# Patient Record
Sex: FEMALE | Race: WHITE | Employment: FULL TIME | URBAN - METROPOLITAN AREA
[De-identification: names, ages, dates, MRNs, and addresses within clinical notes are randomized per-mention and may not be internally consistent; named-entity substitution may affect disease eponyms.]

---

## 2017-01-20 NOTE — ASSESSMENT & PLAN NOTE
Continue Cymbalta, but start lamotrigine 25 nightly titrating every 2 weeks up to 100. PHQ9 score of 19.   In September we had the M[employer] Godinez navigator try to contact her about options but apparently this made her more upset as they were counting her at wo

## 2017-01-20 NOTE — PROGRESS NOTES
Patient presents with:  Depression: PT states for about 2-3 weeks she wakes up from sleeping and she just wants to start crying and having trouble concentrating. Stress: Pt states she is not handling stress she has just been extra angulo.        HPI:   Melvia Nails September we had the Vee Males navigator try to contact her about options but apparently this made her more upset as they were counting her at work. I discussed possibly doing a short-term psychiatry management option that was recently offered.   She is

## 2017-02-03 ENCOUNTER — OFFICE VISIT (OUTPATIENT)
Dept: FAMILY MEDICINE CLINIC | Facility: CLINIC | Age: 57
End: 2017-02-03

## 2017-02-03 VITALS
RESPIRATION RATE: 16 BRPM | HEART RATE: 84 BPM | SYSTOLIC BLOOD PRESSURE: 130 MMHG | BODY MASS INDEX: 31.65 KG/M2 | HEIGHT: 65 IN | TEMPERATURE: 99 F | DIASTOLIC BLOOD PRESSURE: 82 MMHG | WEIGHT: 190 LBS

## 2017-02-03 DIAGNOSIS — I10 ESSENTIAL HYPERTENSION: Primary | ICD-10-CM

## 2017-02-03 DIAGNOSIS — F33.41 RECURRENT MAJOR DEPRESSIVE DISORDER, IN PARTIAL REMISSION (HCC): ICD-10-CM

## 2017-02-03 PROCEDURE — 99213 OFFICE O/P EST LOW 20 MIN: CPT | Performed by: FAMILY MEDICINE

## 2017-02-03 RX ORDER — LOSARTAN POTASSIUM 50 MG/1
50 TABLET ORAL DAILY
Qty: 90 TABLET | Refills: 3 | Status: SHIPPED | OUTPATIENT
Start: 2017-02-03 | End: 2017-02-26

## 2017-02-03 NOTE — PROGRESS NOTES
Patient presents with:  Depression: 2 week f/u Pt states she has been feeling good on medication. Pt states has had some muscle spasm s in the past week.    Medication Request: Losartan       HPI:   This is a 49year old female coming in for depression, muc Continue present management.     Blood Pressure and Cardiac Medications          Losartan Potassium 50 MG Oral Tab                 Relevant Medications    Losartan Potassium 50 MG Oral Tab       Mental Health    Major depression in partial remission ([facility] [address]

## 2017-02-03 NOTE — ASSESSMENT & PLAN NOTE
Stable, Continue present management.     Blood Pressure and Cardiac Medications          Losartan Potassium 50 MG Oral Tab

## 2017-02-26 DIAGNOSIS — I10 ESSENTIAL HYPERTENSION: ICD-10-CM

## 2017-02-26 DIAGNOSIS — J45.30 MILD PERSISTENT ASTHMA WITHOUT COMPLICATION: Primary | ICD-10-CM

## 2017-02-27 RX ORDER — LOSARTAN POTASSIUM 50 MG/1
TABLET ORAL
Qty: 90 TABLET | Refills: 1 | Status: SHIPPED | OUTPATIENT
Start: 2017-02-27 | End: 2018-03-14

## 2017-02-27 NOTE — TELEPHONE ENCOUNTER
Refill for Losartan and Foradil approved per protocol. LOV 02/03/17 and ACT 01/20/17. Labs 06/03/16.

## 2017-02-27 NOTE — TELEPHONE ENCOUNTER
Refill request for Cymbalta and Lamotrigine requested. LOV 02/03/17 and labs 06/03/16. Will you approve ? Routed to Dr Jay Epstein.

## 2017-02-28 ENCOUNTER — TELEPHONE (OUTPATIENT)
Dept: FAMILY MEDICINE CLINIC | Facility: CLINIC | Age: 57
End: 2017-02-28

## 2017-02-28 NOTE — TELEPHONE ENCOUNTER
[facility] pharmacy calling to inform us that med FORADIL AEROLIZER 12 MCG Inhalation Cap has been discontinued. What else can she have?

## 2017-03-02 DIAGNOSIS — J45.30 MILD PERSISTENT ASTHMA WITHOUT COMPLICATION: Primary | ICD-10-CM

## 2017-03-02 NOTE — PROGRESS NOTES
Switch patient to Flovent HFA from Foradil for asthma prevention, different action. F/u advised in 1 month to review asthma tx plan.

## 2017-03-02 NOTE — TELEPHONE ENCOUNTER
I sent a new script to pharmacy for Flovent HFA, substituting Foradil. Different action medication, as this is a steroid inhaler. Pt should f/u in 1 month for review of asthma treatment plan. Make sure she rinses mouth after use to prevent thrush. Thanks.

## 2017-03-03 NOTE — TELEPHONE ENCOUNTER
Notified pt that Flovent has been prescribed to replace Foradil. Reviewed directives with pt and she will follow up in office in one month.

## 2017-03-31 NOTE — PROGRESS NOTES
Patient presents with:  Depression  Cough: x1 week w/ productive cough and bodyaches. HPI:   This is a 49year old female coming in for derpession and bronchitis, sugars as well are up    Depression better, and Lamictal only one nightly. O    HPI membrane, external ear and ear canal normal.   Left Ear: Hearing, tympanic membrane and external ear normal.   Nose: Mucosal edema present. No rhinorrhea, sinus tenderness or septal deviation. No epistaxis. Right sinus exhibits frontal sinus tenderness.  Ri

## 2017-04-02 ENCOUNTER — HOSPITAL ENCOUNTER (OUTPATIENT)
Age: 57
Discharge: HOME | End: 2017-04-02
Attending: EMERGENCY MEDICINE
Payer: COMMERCIAL

## 2017-04-02 VITALS
RESPIRATION RATE: 18 BRPM | HEART RATE: 98 BPM | SYSTOLIC BLOOD PRESSURE: 121 MMHG | DIASTOLIC BLOOD PRESSURE: 84 MMHG | HEIGHT: 65.5 IN | WEIGHT: 160 LBS | BODY MASS INDEX: 26.34 KG/M2 | TEMPERATURE: 98 F | OXYGEN SATURATION: 99 %

## 2017-04-02 DIAGNOSIS — B34.9 VIRAL ILLNESS: Primary | ICD-10-CM

## 2017-04-02 PROCEDURE — 99203 OFFICE O/P NEW LOW 30 MIN: CPT

## 2017-04-02 PROCEDURE — 99204 OFFICE O/P NEW MOD 45 MIN: CPT

## 2017-04-02 PROCEDURE — 81002 URINALYSIS NONAUTO W/O SCOPE: CPT

## 2017-04-02 PROCEDURE — 82962 GLUCOSE BLOOD TEST: CPT

## 2017-04-02 RX ORDER — BENZONATATE 100 MG/1
100 CAPSULE ORAL 3 TIMES DAILY PRN
Qty: 20 CAPSULE | Refills: 0 | Status: SHIPPED | OUTPATIENT
Start: 2017-04-02 | End: 2017-06-09 | Stop reason: ALTCHOICE

## 2017-04-02 NOTE — ED NOTES
Patient states she also just feels tired. She states she is fighting a URI that she is being treated for and the cough keeps her up.   She denies real numbness at this time in her bilateral thighs but she stated at times she couldn't feel her thigh when sh

## 2017-04-02 NOTE — ED PROVIDER NOTES
Patient Seen in: [address]    History   Patient presents with:  Numbness Weakness (neurologic)    Stated Complaint: numbness in thighs feels sweaty     HPI    Patient presents to immediate care complaining of vague symp deficiency      Dx in 2012   • Borderline diabetes      Dx in 2013           Past Surgical History    TONSILLECTOMY      Comment In childhood    OTHER SURGICAL HISTORY      Comment Dental surgeries       Medications :   benzonatate (TESSALON PERLES) 100 MG Disorder Neg          Smoking Status: Never Smoker                      Smokeless Status: Never Used                        Alcohol Use: Yes           0.0 oz/week       0 Standard drinks or equivalent per week       Comment: 1 drink a month      Review of HENT:   Head: Normocephalic and atraumatic. Right Ear: External ear normal.   Left Ear: External ear normal.   Nose: Nose normal.   Mouth/Throat: Oropharynx is clear and moist. No oropharyngeal exudate.    Eyes: EOM are normal. Pupils are equal, round, capsule Refills: 0

## 2017-04-02 NOTE — ED INITIAL ASSESSMENT (HPI)
Patient is here with complaints of feeling like she is burning up but is without a fever and numbness in both thighs. She states it started two days ago.

## 2017-04-03 ENCOUNTER — TELEPHONE (OUTPATIENT)
Dept: FAMILY MEDICINE CLINIC | Facility: CLINIC | Age: 57
End: 2017-04-03

## 2017-04-03 NOTE — TELEPHONE ENCOUNTER
Patient would like to speak to nurse. Patient is not feeling any better, pt was at the urgent care yesterday. Patient still has an ongoing cough feels like she's burning up but no fever and congested. Please contact patient.

## 2017-04-03 NOTE — TELEPHONE ENCOUNTER
Pt called to report that after her 3/31/17 visit here , Pt ended up going to Urgent Care on yesterday due to concern over productive cough.  Pt has been taking her Z-Pack and Breo as ordered, but Pt says she was feeling warm and coughing up brown/yellow spu

## 2017-05-08 ENCOUNTER — OFFICE VISIT (OUTPATIENT)
Dept: FAMILY MEDICINE CLINIC | Facility: CLINIC | Age: 57
End: 2017-05-08

## 2017-05-08 VITALS
WEIGHT: 192.38 LBS | RESPIRATION RATE: 16 BRPM | DIASTOLIC BLOOD PRESSURE: 70 MMHG | BODY MASS INDEX: 32 KG/M2 | TEMPERATURE: 99 F | HEART RATE: 80 BPM | SYSTOLIC BLOOD PRESSURE: 110 MMHG

## 2017-05-08 DIAGNOSIS — R05.9 COUGH IN ADULT: Primary | ICD-10-CM

## 2017-05-08 DIAGNOSIS — K59.04 CHRONIC IDIOPATHIC CONSTIPATION: ICD-10-CM

## 2017-05-08 DIAGNOSIS — R73.03 PREDIABETES: ICD-10-CM

## 2017-05-08 DIAGNOSIS — K21.9 GASTROESOPHAGEAL REFLUX DISEASE, ESOPHAGITIS PRESENCE NOT SPECIFIED: ICD-10-CM

## 2017-05-08 PROCEDURE — 99214 OFFICE O/P EST MOD 30 MIN: CPT | Performed by: FAMILY MEDICINE

## 2017-05-08 RX ORDER — METFORMIN HYDROCHLORIDE 500 MG/1
500 TABLET, EXTENDED RELEASE ORAL DAILY
Qty: 90 TABLET | Refills: 0 | Status: SHIPPED | OUTPATIENT
Start: 2017-05-08 | End: 2017-06-09 | Stop reason: SINTOL

## 2017-05-08 NOTE — PROGRESS NOTES
Chief Complaint:  Patient presents with:  Cough: this started sbout 3 weeks- was on Abx- still taking TessalonPelrle- still coughing up yellow-foamy like sputum  Medication Follow-Up: the Metformin is causing weight gain and bloating- may not be tolerating diabetes      Dx in 2013     Past Surgical History   Procedure Laterality Date   • Tonsillectomy       In childhood   • Other surgical history       Dental surgeries       Family History   Problem Relation Age of Onset   • Arthritis Mother    • Cancer Moth TABLET BY MOUTH DAILY. Disp: 90 tablet Rfl: 3   Clobetasol Propionate (TEMOVATE) 0.05 % Apply Externally Solution Apply topically to affected area twice a day as needed.  Disp: 50 mL Rfl: 5   ALBUTEROL SULFATE (VENTOLIN HFA) 108 (90 BASE) MCG/ACT Inhalation specified               Advised the following:  Sherman Haywood was seen today for cough, medication follow-up and nasal congestion. Diagnoses and all orders for this visit:    Cough in adult  Unclear etiology.  Feel this is likely still recovery from previous ill

## 2017-05-19 ENCOUNTER — TELEPHONE (OUTPATIENT)
Dept: FAMILY MEDICINE CLINIC | Facility: CLINIC | Age: 57
End: 2017-05-19

## 2017-05-19 NOTE — TELEPHONE ENCOUNTER
Called and talked to patient the metformin was causing her to gain weight when she stopped it the weight started to go down I told her I would discuss this with Dr James Turcios and see what other options she had

## 2017-05-19 NOTE — TELEPHONE ENCOUNTER
OK to stop. A1c 6.1%. May be water weight gain. Let's see next 1 months' changes.  Future Appointments  Date Time Provider Letty Avendaño   5/26/2017 2:30 PM Neli Rehman MD [facility]   6/9/2017 11:30 AM Mario Green MD [facility] [address] [facility] [address]

## 2017-05-19 NOTE — TELEPHONE ENCOUNTER
On Metformin and she is having an issue with bloating, pants won't fit, gained 7-8 lbs, feet and hands swelling.  She stopped taking it and all issues have stopped, does she need to be seen again for a new prescription to be issued or can you prescribe for

## 2017-05-30 RX ORDER — LAMOTRIGINE 25 MG/1
TABLET ORAL
Qty: 90 TABLET | Refills: 3 | Status: SHIPPED | OUTPATIENT
Start: 2017-05-30 | End: 2018-02-20

## 2017-06-08 RX ORDER — LEVOTHYROXINE SODIUM 112 UG/1
TABLET ORAL
Qty: 90 TABLET | Refills: 3 | Status: SHIPPED | OUTPATIENT
Start: 2017-06-08 | End: 2018-08-31

## 2017-06-08 NOTE — TELEPHONE ENCOUNTER
Refill request for:      Pending Prescriptions Disp Refills    LEVOTHYROXINE SODIUM 112 MCG Oral Tab [Pharmacy Med Name: LEVOTHYROXINE 112 MCG TABLET] 90 tablet 3     Sig: TAKE 1 TABLET BY MOUTH DAILY.             LOV 5/8/2017     Future Appointments  Date

## 2017-06-09 ENCOUNTER — OFFICE VISIT (OUTPATIENT)
Dept: FAMILY MEDICINE CLINIC | Facility: CLINIC | Age: 57
End: 2017-06-09

## 2017-06-09 VITALS
RESPIRATION RATE: 16 BRPM | DIASTOLIC BLOOD PRESSURE: 80 MMHG | HEART RATE: 76 BPM | SYSTOLIC BLOOD PRESSURE: 112 MMHG | WEIGHT: 190 LBS | BODY MASS INDEX: 31.65 KG/M2 | TEMPERATURE: 99 F | HEIGHT: 65 IN

## 2017-06-09 DIAGNOSIS — F33.41 RECURRENT MAJOR DEPRESSIVE DISORDER, IN PARTIAL REMISSION (HCC): ICD-10-CM

## 2017-06-09 DIAGNOSIS — M25.551 RIGHT HIP PAIN: ICD-10-CM

## 2017-06-09 DIAGNOSIS — Z12.31 VISIT FOR SCREENING MAMMOGRAM: ICD-10-CM

## 2017-06-09 DIAGNOSIS — E03.9 HYPOTHYROIDISM (ACQUIRED): ICD-10-CM

## 2017-06-09 DIAGNOSIS — I10 ESSENTIAL HYPERTENSION: ICD-10-CM

## 2017-06-09 DIAGNOSIS — R73.03 PREDIABETES: Primary | ICD-10-CM

## 2017-06-09 PROCEDURE — 99214 OFFICE O/P EST MOD 30 MIN: CPT | Performed by: FAMILY MEDICINE

## 2017-06-09 NOTE — PROGRESS NOTES
HPI:   Patient presents with:  Hypertension: 6month f/u       Anny Gamez is a 56year old female who presents for recheck of her hypertension. She has been taking medications as instructed, with no medication side effects.  Her home BP monitoring Particles TAKE ONE CAPSULE BY MOUTH TWICE A DAY   Mometasone Furoate 0.1 % External Cream APPLY TO BACK TWICE A DAY   Clobetasol Propionate (TEMOVATE) 0.05 % Apply Externally Solution Apply topically to affected area twice a day as needed.    ALBUTEROL SULF adenopathy,no bruits  LUNGS: clear to auscultation  CARDIO: RRR without murmur  GI: good BS's,no masses, HSM or tenderness  EXTREMITIES: no cyanosis, clubbing or edema  NEURO: A&O to person place and time.     ASSESSMENT AND PLAN:   Noa Jerry is Diagnoses     Right hip pain         Relevant Orders     XR HIP + PELVIS MIN 4 VIEWS RIGHT (CPT=73503)     Visit for screening mammogram         Relevant Orders     JUAN MANUEL SCREENING BILAT (CPT=77067)            Return in about 6 months (around 12/9/2017).

## 2017-06-09 NOTE — ASSESSMENT & PLAN NOTE
As for her Pre-Diabetes, it is well controlled, no significant medication side effects noted. Recommendations are: continue present meds, lose weight by increased dietary compliance and exercise and will check labs as ordered.     Lab Results  Component

## 2017-06-09 NOTE — ASSESSMENT & PLAN NOTE
Stable, Continue present management.     Thyroid  (most recent labs)     Lab Results  Component Value Date/Time   TSH 2.06 03/25/2017 10:34 AM   T4F 1.2 03/25/2017 10:34 AM         Endocrine Medications          LEVOTHYROXINE SODIUM 112 MCG Oral Tab

## 2017-08-28 PROBLEM — K31.7 GASTRIC POLYPS: Status: ACTIVE | Noted: 2017-08-28

## 2017-08-28 PROBLEM — K44.9 HIATAL HERNIA: Status: ACTIVE | Noted: 2017-08-28

## 2017-08-28 PROBLEM — K29.70 GASTRITIS: Status: ACTIVE | Noted: 2017-08-28

## 2017-08-28 PROBLEM — K57.90 DIVERTICULOSIS: Status: ACTIVE | Noted: 2017-08-28

## 2017-08-28 PROBLEM — K63.5 COLON POLYPS: Status: ACTIVE | Noted: 2017-08-28

## 2017-09-07 ENCOUNTER — TELEPHONE (OUTPATIENT)
Dept: FAMILY MEDICINE CLINIC | Facility: CLINIC | Age: 57
End: 2017-09-07

## 2017-09-07 DIAGNOSIS — Z13.228 SCREENING FOR ENDOCRINE, METABOLIC AND IMMUNITY DISORDER: ICD-10-CM

## 2017-09-07 DIAGNOSIS — Z13.0 SCREENING FOR ENDOCRINE, METABOLIC AND IMMUNITY DISORDER: ICD-10-CM

## 2017-09-07 DIAGNOSIS — Z13.220 SCREENING FOR LIPID DISORDERS: Primary | ICD-10-CM

## 2017-09-07 DIAGNOSIS — Z13.29 SCREENING FOR ENDOCRINE, METABOLIC AND IMMUNITY DISORDER: ICD-10-CM

## 2017-09-07 NOTE — TELEPHONE ENCOUNTER
Please place orders for CMP and lipid panel to her lab. She has existing orders for other labs already.

## 2017-09-07 NOTE — TELEPHONE ENCOUNTER
Patient scheduled physical for 9-11 please place lab orders to [facility] [place], [place].

## 2017-09-09 LAB
ALBUMIN/GLOBULIN RATIO: 1.6 (CALC) (ref 1–2.5)
ALBUMIN: 4.1 G/DL (ref 3.6–5.1)
ALKALINE PHOSPHATASE: 111 U/L (ref 33–130)
ALT: 26 U/L (ref 6–29)
AST: 23 U/L (ref 10–35)
BILIRUBIN, TOTAL: 0.8 MG/DL (ref 0.2–1.2)
BUN: 12 MG/DL (ref 7–25)
CALCIUM: 9.2 MG/DL (ref 8.6–10.4)
CARBON DIOXIDE: 25 MMOL/L (ref 20–31)
CHLORIDE: 105 MMOL/L (ref 98–110)
CHOL/HDLC RATIO: 4.2 (CALC)
CHOLESTEROL, TOTAL: 220 MG/DL
CREATININE: 0.65 MG/DL (ref 0.5–1.05)
EGFR IF AFRICN AM: 116 ML/MIN/1.73M2
EGFR IF NONAFRICN AM: 100 ML/MIN/1.73M2
GLOBULIN: 2.5 G/DL (CALC) (ref 1.9–3.7)
GLUCOSE: 108 MG/DL (ref 65–99)
HDL CHOLESTEROL: 52 MG/DL
LDL-CHOLESTEROL: 141 MG/DL (CALC)
NON-HDL CHOLESTEROL: 168 MG/DL (CALC)
POTASSIUM: 4.1 MMOL/L (ref 3.5–5.3)
PROTEIN, TOTAL: 6.6 G/DL (ref 6.1–8.1)
SODIUM: 140 MMOL/L (ref 135–146)
TRIGLYCERIDES: 143 MG/DL

## 2017-09-11 ENCOUNTER — OFFICE VISIT (OUTPATIENT)
Dept: FAMILY MEDICINE CLINIC | Facility: CLINIC | Age: 57
End: 2017-09-11

## 2017-09-11 ENCOUNTER — TELEPHONE (OUTPATIENT)
Dept: FAMILY MEDICINE CLINIC | Facility: CLINIC | Age: 57
End: 2017-09-11

## 2017-09-11 VITALS
TEMPERATURE: 99 F | DIASTOLIC BLOOD PRESSURE: 80 MMHG | BODY MASS INDEX: 30.52 KG/M2 | SYSTOLIC BLOOD PRESSURE: 130 MMHG | RESPIRATION RATE: 16 BRPM | WEIGHT: 185.38 LBS | HEIGHT: 65.25 IN | HEART RATE: 92 BPM

## 2017-09-11 DIAGNOSIS — I83.90 VARICOSE VEINS OF LOWER EXTREMITY: ICD-10-CM

## 2017-09-11 DIAGNOSIS — Z00.00 ROUTINE GENERAL MEDICAL EXAMINATION AT A HEALTH CARE FACILITY: Primary | ICD-10-CM

## 2017-09-11 DIAGNOSIS — R73.03 PREDIABETES: ICD-10-CM

## 2017-09-11 PROCEDURE — 99396 PREV VISIT EST AGE 40-64: CPT | Performed by: PHYSICIAN ASSISTANT

## 2017-09-11 NOTE — TELEPHONE ENCOUNTER
Patient requesting refill of LEVOTHYROXINE SODIUM 112 MCG Oral Tab, would like sent to [facility]. Patient was seen today and forgot to ask for refill

## 2017-09-11 NOTE — PATIENT INSTRUCTIONS
Prevention Guidelines, Women Ages 48 to 59  Screening tests and vaccines are an important part of managing your health. Health counseling is essential, too. Below are guidelines for these, for women ages 48 to 59.  Talk with your healthcare provider to ma Lung cancer Adults age 54 to [de-identified] who have smoked Yearly screening in smokers with 30 pack-year history of smoking or who quit within 15 years   Obesity All women in this age group At routine exams   Osteoporosis Women who are postmenopausal Ask your healthc PPSV23: 1 to 2 doses through age 59, or 1 dose at 72 or older (protects against 23 types of pneumococcal bacteria)   Tetanus/diphtheria/pertussis (Td/Tdap) booster All women in this age group Td every 10 years, or a one-time dose of Tdap instead of a Td angie Varicose veins may or may not cause symptoms.  If symptoms do occur, they can include:  · Legs that feel tired, achy, heavy, or itchy  · Leg muscle cramps  · Skin changes, such as discoloration, dryness, redness, or rash (in more severe cases, you may also Follow up with your healthcare provider, or as directed. If imaging tests were done, you’ll be told the results and if there are any new findings that affect your care.   When to seek medical advice  Call your healthcare provider right away if any of these

## 2017-09-11 NOTE — PROGRESS NOTES
Sara Corea is a 56year old female who presents for a complete physical exam. She has no complaints other than painless mild varicose veins of the RLE.  HPI:   See GYN for PAP and mammograms.       Wt Readings from Last 6 Encounters:  09/11/17 : 185 lb 6.4 oz  0 DR Particles TAKE ONE CAPSULE BY MOUTH TWICE A DAY Disp: 180 capsule Rfl: 1   Clobetasol Propionate (TEMOVATE) 0.05 % Apply Externally Solution Apply topically to affected area twice a day as needed.  Disp: 50 mL Rfl: 5   ALBUTEROL SULFATE (VENTOLIN HFA) 10 Smokeless tobacco: Never Used                      Alcohol use: Yes           0.0 oz/week     Comment: 1 drink a month        REVIEW OF SYSTEMS:   General: Feels well otherwise  Skin: Denies any unusual skin lesions, rashes, or change in moles  Eyes: D examination at a health care facility  (primary encounter diagnosis)  Prediabetes  Varicose veins of lower extremity  I discussed her labs with Osmin Ernandez. She will continue to exercise and watch carb intake. Forms for her employer filled out.   A referral for

## 2017-09-12 ENCOUNTER — HOSPITAL ENCOUNTER (OUTPATIENT)
Dept: MAMMOGRAPHY | Age: 57
Discharge: HOME | End: 2017-09-12
Attending: FAMILY MEDICINE
Payer: COMMERCIAL

## 2017-09-12 ENCOUNTER — HOSPITAL ENCOUNTER (OUTPATIENT)
Dept: GENERAL RADIOLOGY | Age: 57
Discharge: HOME | End: 2017-09-12
Attending: FAMILY MEDICINE
Payer: COMMERCIAL

## 2017-09-12 DIAGNOSIS — Z12.31 VISIT FOR SCREENING MAMMOGRAM: ICD-10-CM

## 2017-09-12 DIAGNOSIS — M25.551 RIGHT HIP PAIN: ICD-10-CM

## 2017-09-12 PROCEDURE — 77067 SCR MAMMO BI INCL CAD: CPT | Performed by: FAMILY MEDICINE

## 2017-09-12 PROCEDURE — 73502 X-RAY EXAM HIP UNI 2-3 VIEWS: CPT | Performed by: FAMILY MEDICINE

## 2017-09-14 ENCOUNTER — HOSPITAL ENCOUNTER (OUTPATIENT)
Dept: MAMMOGRAPHY | Facility: HOSPITAL | Age: 57
Discharge: HOME | End: 2017-09-14
Attending: FAMILY MEDICINE
Payer: COMMERCIAL

## 2017-09-14 DIAGNOSIS — R92.8 ABNORMAL MAMMOGRAM OF LEFT BREAST: ICD-10-CM

## 2017-09-14 PROCEDURE — 77065 DX MAMMO INCL CAD UNI: CPT | Performed by: FAMILY MEDICINE

## 2017-09-14 PROCEDURE — 76642 ULTRASOUND BREAST LIMITED: CPT | Performed by: FAMILY MEDICINE

## 2017-09-14 PROCEDURE — 77061 BREAST TOMOSYNTHESIS UNI: CPT | Performed by: FAMILY MEDICINE

## 2017-10-16 ENCOUNTER — OFFICE VISIT (OUTPATIENT)
Dept: FAMILY MEDICINE CLINIC | Facility: CLINIC | Age: 57
End: 2017-10-16

## 2017-10-16 VITALS
DIASTOLIC BLOOD PRESSURE: 64 MMHG | TEMPERATURE: 99 F | BODY MASS INDEX: 30.99 KG/M2 | HEIGHT: 65 IN | HEART RATE: 84 BPM | WEIGHT: 186 LBS | SYSTOLIC BLOOD PRESSURE: 124 MMHG

## 2017-10-16 DIAGNOSIS — J02.9 SORE THROAT: Primary | ICD-10-CM

## 2017-10-16 PROCEDURE — 87880 STREP A ASSAY W/OPTIC: CPT | Performed by: PHYSICIAN ASSISTANT

## 2017-10-16 PROCEDURE — 99213 OFFICE O/P EST LOW 20 MIN: CPT | Performed by: PHYSICIAN ASSISTANT

## 2017-10-16 NOTE — PROGRESS NOTES
CC:  Patient presents with:  Sore Throat: x  1 week, glands in neck feel swolled, difficulty swallowing      HPI: Sejal Viera presents with complaints of a ST and chills. Symptoms have been present for 7 days. She has taken nothing OTC today.  No cough or N/V. Furoate-Vilanterol (BREO ELLIPTA) 200-25 MCG/INH Inhalation Aerosol Powder, Breath Activated Inhale 1 puff into the lungs daily.  Disp: 3 each Rfl: 3   LOSARTAN POTASSIUM 50 MG Oral Tab TAKE 1 TABLET EVERY DAY Disp: 90 tablet Rfl: 1   CYMBALTA 60 MG Oral Ca deviation    Throat: No erythema; no oral lesions or exudates; tonsils not enlarged   Eyes: PERRLA; no ulcers, abrasions, or FBs; lids normal; no edema   Neck: Normal ROM; no deformities or tenderness to palpation; no thyromegaly  Lymph:  No cervical/supra

## 2017-12-02 ENCOUNTER — TELEPHONE (OUTPATIENT)
Dept: FAMILY MEDICINE CLINIC | Facility: CLINIC | Age: 57
End: 2017-12-02

## 2017-12-02 NOTE — TELEPHONE ENCOUNTER
Patient is looking for a refill on her medication to be refilled from 2012. She has an appt scheduled for 12/14/17 with Dr. Jacob Corbin and she is seeing a chiropractor for pain and spasms. She can not come in today.

## 2017-12-02 NOTE — TELEPHONE ENCOUNTER
Called and talked to patient she would like a refill of the Flexeril from 2012 because she is having back spasms which she is seeing chiropractor for I will send this to Dr Luis Singh to see if he will refill this

## 2017-12-03 DIAGNOSIS — F33.41 RECURRENT MAJOR DEPRESSIVE DISORDER, IN PARTIAL REMISSION (HCC): ICD-10-CM

## 2017-12-03 RX ORDER — CYCLOBENZAPRINE HCL 10 MG
10 TABLET ORAL 3 TIMES DAILY
Qty: 30 TABLET | Refills: 1 | Status: SHIPPED | OUTPATIENT
Start: 2017-12-03 | End: 2017-12-23

## 2017-12-14 ENCOUNTER — OFFICE VISIT (OUTPATIENT)
Dept: FAMILY MEDICINE CLINIC | Facility: CLINIC | Age: 57
End: 2017-12-14

## 2017-12-14 VITALS
WEIGHT: 188 LBS | RESPIRATION RATE: 14 BRPM | TEMPERATURE: 98 F | BODY MASS INDEX: 31.32 KG/M2 | SYSTOLIC BLOOD PRESSURE: 120 MMHG | HEART RATE: 66 BPM | DIASTOLIC BLOOD PRESSURE: 68 MMHG | HEIGHT: 65 IN

## 2017-12-14 DIAGNOSIS — F33.41 RECURRENT MAJOR DEPRESSIVE DISORDER, IN PARTIAL REMISSION (HCC): ICD-10-CM

## 2017-12-14 DIAGNOSIS — M54.2 NECK PAIN: ICD-10-CM

## 2017-12-14 DIAGNOSIS — E03.9 HYPOTHYROIDISM (ACQUIRED): ICD-10-CM

## 2017-12-14 DIAGNOSIS — I10 ESSENTIAL HYPERTENSION: Primary | ICD-10-CM

## 2017-12-14 DIAGNOSIS — M54.59 MECHANICAL LOW BACK PAIN: ICD-10-CM

## 2017-12-14 DIAGNOSIS — R73.03 PREDIABETES: ICD-10-CM

## 2017-12-14 PROCEDURE — 99214 OFFICE O/P EST MOD 30 MIN: CPT | Performed by: FAMILY MEDICINE

## 2017-12-14 RX ORDER — CYCLOBENZAPRINE HCL 5 MG
TABLET ORAL 3 TIMES DAILY PRN
Qty: 30 TABLET | Refills: 1 | Status: SHIPPED | OUTPATIENT
Start: 2017-12-14 | End: 2018-07-27 | Stop reason: ALTCHOICE

## 2017-12-14 NOTE — PROGRESS NOTES
Patient presents with:  Depression: med check   Spasms: Pt states she would like a muscle relaxer for back       HPI:   This is a 56year old female coming in for follow-up hypertension and back. Back spasms ongoing.   Depression is stable and needs refill Constitutional: She is oriented to person, place, and time. She appears well-developed and well-nourished. No distress. HENT:   Head: Normocephalic. Neck: Normal range of motion. Neck supple.    Cardiovascular: Normal rate, regular rhythm, S1 normal, Partial remission, continue this level, get light therapy. Stable, reassess in 3 months.              Other    Prediabetes    Overview     A1c 6.0, endocrinologist started metformin 500 BID         Current Assessment & Plan     As for her Pre-Diabetes,

## 2018-02-20 ENCOUNTER — OFFICE VISIT (OUTPATIENT)
Dept: FAMILY MEDICINE CLINIC | Facility: CLINIC | Age: 58
End: 2018-02-20

## 2018-02-20 VITALS — BODY MASS INDEX: 30.29 KG/M2 | RESPIRATION RATE: 16 BRPM | WEIGHT: 184 LBS | HEIGHT: 65.5 IN

## 2018-02-20 DIAGNOSIS — L30.9 ECZEMA, UNSPECIFIED TYPE: ICD-10-CM

## 2018-02-20 DIAGNOSIS — J01.40 ACUTE NON-RECURRENT PANSINUSITIS: Primary | ICD-10-CM

## 2018-02-20 DIAGNOSIS — L65.9 HAIR LOSS: ICD-10-CM

## 2018-02-20 PROCEDURE — 99214 OFFICE O/P EST MOD 30 MIN: CPT | Performed by: FAMILY MEDICINE

## 2018-02-20 NOTE — PROGRESS NOTES
HPI:   Kaylynn Cheney is a 56year old female who presents for upper respiratory symptoms. Pt c/o sinus pressure and left eye. Sx for 3-4 day. Mild runny nose, no sore throat. No fever. No otc meds. Pressure worse when she is laying down.   Had kameron feels well otherwise, no fever  HEENT: no sore throat  LUNGS: denies shortness of breath and wheezing  GI: no nausea, vomiting or abdominal pain     EXAM:   Resp 16   Ht 65.5\"   Wt 184 lb   LMP 07/27/2015   BMI 30.15 kg/m²   GENERAL: well developed, well

## 2018-03-11 LAB
ALBUMIN/GLOBULIN RATIO: 1.5 (CALC) (ref 1–2.5)
ALBUMIN: 4 G/DL (ref 3.6–5.1)
ALKALINE PHOSPHATASE: 105 U/L (ref 33–130)
ALT: 30 U/L (ref 6–29)
AST: 23 U/L (ref 10–35)
BILIRUBIN, TOTAL: 0.4 MG/DL (ref 0.2–1.2)
BUN: 17 MG/DL (ref 7–25)
CALCIUM: 9.2 MG/DL (ref 8.6–10.4)
CARBON DIOXIDE: 27 MMOL/L (ref 20–31)
CHLORIDE: 106 MMOL/L (ref 98–110)
CHOL/HDLC RATIO: 3.9 (CALC)
CHOLESTEROL, TOTAL: 217 MG/DL
CREATININE: 0.73 MG/DL (ref 0.5–1.05)
EGFR IF AFRICN AM: 107 ML/MIN/1.73M2
EGFR IF NONAFRICN AM: 93 ML/MIN/1.73M2
GLOBULIN: 2.7 G/DL (CALC) (ref 1.9–3.7)
GLUCOSE: 107 MG/DL (ref 65–99)
HDL CHOLESTEROL: 55 MG/DL
HEMOGLOBIN A1C: 5.6 % OF TOTAL HGB
LDL-CHOLESTEROL: 138 MG/DL (CALC)
NON-HDL CHOLESTEROL: 162 MG/DL (CALC)
POTASSIUM: 4.3 MMOL/L (ref 3.5–5.3)
PROTEIN, TOTAL: 6.7 G/DL (ref 6.1–8.1)
SODIUM: 140 MMOL/L (ref 135–146)
T4, FREE: 1.1 NG/DL (ref 0.8–1.8)
TRIGLYCERIDES: 119 MG/DL
TSH: 1.86 MIU/L

## 2018-03-12 PROBLEM — K29.70 GASTRITIS: Status: RESOLVED | Noted: 2017-08-28 | Resolved: 2018-03-12

## 2018-03-12 PROBLEM — K57.90 DIVERTICULOSIS: Status: RESOLVED | Noted: 2017-08-28 | Resolved: 2018-03-12

## 2018-03-12 LAB
FERRITIN: 37 NG/ML (ref 10–232)
VITAMIN D, 25-OH, TOTAL: 34 NG/ML (ref 30–100)

## 2018-03-14 ENCOUNTER — OFFICE VISIT (OUTPATIENT)
Dept: FAMILY MEDICINE CLINIC | Facility: CLINIC | Age: 58
End: 2018-03-14

## 2018-03-14 VITALS
TEMPERATURE: 98 F | DIASTOLIC BLOOD PRESSURE: 80 MMHG | HEIGHT: 65 IN | SYSTOLIC BLOOD PRESSURE: 120 MMHG | RESPIRATION RATE: 14 BRPM | HEART RATE: 72 BPM | WEIGHT: 184 LBS | BODY MASS INDEX: 30.66 KG/M2

## 2018-03-14 DIAGNOSIS — L71.9 ROSACEA: ICD-10-CM

## 2018-03-14 DIAGNOSIS — R73.03 PREDIABETES: ICD-10-CM

## 2018-03-14 DIAGNOSIS — E03.9 HYPOTHYROIDISM (ACQUIRED): ICD-10-CM

## 2018-03-14 DIAGNOSIS — F33.41 RECURRENT MAJOR DEPRESSIVE DISORDER, IN PARTIAL REMISSION (HCC): ICD-10-CM

## 2018-03-14 DIAGNOSIS — I10 ESSENTIAL HYPERTENSION: Primary | ICD-10-CM

## 2018-03-14 PROCEDURE — 99214 OFFICE O/P EST MOD 30 MIN: CPT | Performed by: FAMILY MEDICINE

## 2018-03-14 RX ORDER — AZITHROMYCIN 250 MG/1
250 TABLET, FILM COATED ORAL EVERY OTHER DAY
Qty: 45 TABLET | Refills: 3 | Status: SHIPPED | OUTPATIENT
Start: 2018-03-14 | End: 2018-12-17

## 2018-03-14 RX ORDER — LOSARTAN POTASSIUM 50 MG/1
50 TABLET ORAL
Qty: 90 TABLET | Refills: 3 | Status: SHIPPED | OUTPATIENT
Start: 2018-03-14 | End: 2018-08-03

## 2018-03-14 NOTE — PROGRESS NOTES
HPI:   Patient presents with:  Hypertension: 3 month f/u   Lesion: x2 weeks w/ sore on buttock. Pt states has swelling and redness. Pt states not going away. Megan Díaz is a 56year old female who presents for recheck of her hypertension.  Maricarmen Calle Activated Inhale 1 puff into the lungs daily. Clobetasol Propionate (TEMOVATE) 0.05 % Apply Externally Solution Apply topically to affected area twice a day as needed.    ALBUTEROL SULFATE (VENTOLIN HFA) 108 (90 BASE) MCG/ACT Inhalation Aero Soln Inhale 2 apparent distress  SKIN: no rashes,no suspicious lesions  NECK: supple,no adenopathy,no bruits  LUNGS: clear to auscultation  CARDIO: RRR without murmur  GI: good BS's,no masses, HSM or tenderness  EXTREMITIES: no cyanosis, clubbing or edema  NEURO: A&O to Prediabetes    Overview     A1c 6.0, endocrinologist started metformin 500 BID         Current Assessment & Plan     As for her Pre-Diabetes, it is well controlled, no significant medication side effects noted.      Recommendations are: continue present med

## 2018-03-15 NOTE — ASSESSMENT & PLAN NOTE
Stable, Continue present management.     Thyroid  (most recent labs)     Lab Results  Component Value Date/Time   TSH 1.86 03/10/2018 08:36 AM   T4F 1.1 03/10/2018 08:36 AM         Endocrine Medications          LEVOTHYROXINE SODIUM 112 MCG Oral Tab

## 2018-04-24 DIAGNOSIS — I10 ESSENTIAL HYPERTENSION: ICD-10-CM

## 2018-04-24 RX ORDER — LOSARTAN POTASSIUM 50 MG/1
TABLET ORAL
Qty: 90 TABLET | Refills: 0 | OUTPATIENT
Start: 2018-04-24

## 2018-04-24 NOTE — TELEPHONE ENCOUNTER
Incoming request for Losartan 50 mg. LOV 3/14/2018 and last labs done 3/10/2018.  Future Appointments  Date Time Provider [facility]   5/9/2018 8:00 AM Clarisa Hussein MD [facility]   6/14/2018 11:45 AM William Garcia MD [facility] [address] [facility] [address]

## 2018-06-08 ENCOUNTER — OFFICE VISIT (OUTPATIENT)
Dept: FAMILY MEDICINE CLINIC | Facility: CLINIC | Age: 58
End: 2018-06-08

## 2018-06-08 VITALS
HEIGHT: 65 IN | DIASTOLIC BLOOD PRESSURE: 60 MMHG | WEIGHT: 185 LBS | SYSTOLIC BLOOD PRESSURE: 110 MMHG | BODY MASS INDEX: 30.82 KG/M2 | TEMPERATURE: 98 F | HEART RATE: 70 BPM | RESPIRATION RATE: 16 BRPM

## 2018-06-08 DIAGNOSIS — I10 ESSENTIAL HYPERTENSION: ICD-10-CM

## 2018-06-08 DIAGNOSIS — J45.30 MILD PERSISTENT ASTHMA WITHOUT COMPLICATION: ICD-10-CM

## 2018-06-08 DIAGNOSIS — R00.2 PALPITATION: ICD-10-CM

## 2018-06-08 DIAGNOSIS — F33.41 RECURRENT MAJOR DEPRESSIVE DISORDER, IN PARTIAL REMISSION (HCC): ICD-10-CM

## 2018-06-08 DIAGNOSIS — R26.89 BALANCE PROBLEM: ICD-10-CM

## 2018-06-08 DIAGNOSIS — R07.89 ATYPICAL CHEST PAIN: Primary | ICD-10-CM

## 2018-06-08 PROCEDURE — 99213 OFFICE O/P EST LOW 20 MIN: CPT | Performed by: FAMILY MEDICINE

## 2018-06-08 PROCEDURE — 93000 ELECTROCARDIOGRAM COMPLETE: CPT | Performed by: FAMILY MEDICINE

## 2018-06-08 RX ORDER — ALBUTEROL SULFATE 90 UG/1
2 AEROSOL, METERED RESPIRATORY (INHALATION) EVERY 6 HOURS PRN
Qty: 1 INHALER | Refills: 5 | Status: SHIPPED | OUTPATIENT
Start: 2018-06-08 | End: 2018-11-07

## 2018-06-08 NOTE — PROGRESS NOTES
Chief Complaint:  Patient presents with:  Chest Pressure: started 2-3 weeks ago with chest discomfort, throbbing sensation on neck   Dizziness: pt is feeling better, 2-3 weeks she would tumble alot  Asthma: needs albuterol    HPI:  This is a 66year old fe lower extremity swelling, orthopnea, paroxysmal nocturnal dyspnea. HTN:  Does note check at home. Denies Headaches, SOB, vision changes, chest pain and LE swelling. MDD:  Notes symptoms well controlled on cymbalta. Denies SI/HI.     Asthma: Needs refi Other      Cousins with scleroderma   • Diabetes Neg    • Thyroid disease Neg    • Thyroid Disorder Neg       Smoking status: Never Smoker                                                              Smokeless tobacco: Never Used                      [employer] hair loss  Minocycline             HIVES  Mold                    UNKNOWN  Pristiq [Desvenlafa*        Comment:Hallucinations, headaches, dizziness  Wellbutrin [Bupropi*    ITCHING    Comment:SR TBCR; Headache  Metformin               SWELLING    Comment:G given tenderness over anterior chest wall although this does not describe the throbbing sensation in her neck when symptoms do flare. For this reason, will obtain stress test to rule out cardiac etiology. -     CARD TREADMILL STRESS, ADULT (TUF=36243);  Dheeraj Zhou

## 2018-06-14 ENCOUNTER — OFFICE VISIT (OUTPATIENT)
Dept: FAMILY MEDICINE CLINIC | Facility: CLINIC | Age: 58
End: 2018-06-14

## 2018-06-14 VITALS
SYSTOLIC BLOOD PRESSURE: 110 MMHG | HEART RATE: 76 BPM | TEMPERATURE: 98 F | DIASTOLIC BLOOD PRESSURE: 68 MMHG | HEIGHT: 65 IN | WEIGHT: 186 LBS | RESPIRATION RATE: 14 BRPM | BODY MASS INDEX: 30.99 KG/M2

## 2018-06-14 DIAGNOSIS — R73.03 PREDIABETES: Primary | ICD-10-CM

## 2018-06-14 DIAGNOSIS — I10 ESSENTIAL HYPERTENSION: ICD-10-CM

## 2018-06-14 DIAGNOSIS — F33.41 RECURRENT MAJOR DEPRESSIVE DISORDER, IN PARTIAL REMISSION (HCC): ICD-10-CM

## 2018-06-14 PROCEDURE — 99213 OFFICE O/P EST LOW 20 MIN: CPT | Performed by: FAMILY MEDICINE

## 2018-06-14 NOTE — PROGRESS NOTES
HPI:   Patient presents with:  Blood Pressure: 3 month f/u c  Cough: x2-3 days w/ productive cough, exhausted, chills and scratchy throat. Megan Díaz is a 66year old female who presents for recheck of her hypertension.  She has been taking Cyclobenzaprine HCl 5 MG Oral Tab Take 1-2 tablets (5-10 mg total) by mouth 3 (three) times daily as needed for Muscle spasms.    CYMBALTA 60 MG Oral Cap DR Particles TAKE ONE CAPSULE BY MOUTH TWICE A DAY   LEVOTHYROXINE SODIUM 112 MCG Oral Tab TAKE 1 TAB kg/m². Physical Exam   Nursing note and vitals reviewed. Constitutional: She is oriented to person, place, and time. She appears well-developed and well-nourished. No distress. HENT:   Head: Normocephalic. Neck: Normal range of motion. Neck supple. decreasing to 30 mg Cymbalta, reassess 2 to 3 months.  Weaning dicussed         Relevant Medications    CYMBALTA 30 MG Oral Cap DR Macho       Other    Prediabetes - Primary    Overview     A1c 6.0, endocrinologist started metformin 500 BID         Curr

## 2018-07-06 ENCOUNTER — HOSPITAL ENCOUNTER (OUTPATIENT)
Dept: CV DIAGNOSTICS | Facility: HOSPITAL | Age: 58
Discharge: HOME | End: 2018-07-06
Attending: FAMILY MEDICINE
Payer: COMMERCIAL

## 2018-07-06 DIAGNOSIS — R07.89 ATYPICAL CHEST PAIN: ICD-10-CM

## 2018-07-06 PROCEDURE — 93018 CV STRESS TEST I&R ONLY: CPT | Performed by: FAMILY MEDICINE

## 2018-07-06 PROCEDURE — 93017 CV STRESS TEST TRACING ONLY: CPT | Performed by: FAMILY MEDICINE

## 2018-07-24 ENCOUNTER — TELEPHONE (OUTPATIENT)
Dept: FAMILY MEDICINE CLINIC | Facility: CLINIC | Age: 58
End: 2018-07-24

## 2018-07-24 NOTE — TELEPHONE ENCOUNTER
Pt sent message to Dr Dione Contreras and she really needs someone to look at It patient was teary and wanted to bring it to our attention.

## 2018-07-24 NOTE — TELEPHONE ENCOUNTER
Not likely medication anymore. Can we get MATTHEW help line evaluation [phone]- looks like she may need more help than we can give in person or over phone or through [employer].

## 2018-07-24 NOTE — TELEPHONE ENCOUNTER
Copied from My Chart message   Dr. Cassandra Fregoso   I am having serious issues.  I cannot type anymore.  I'm spending so much time trying to correct my error and I'm still missing stuff.  It is affecting my work. Sangeeta Funes is affecting my emotions because I get so upset

## 2018-07-24 NOTE — TELEPHONE ENCOUNTER
Called patient and discussed Dr Thanh Horvath suggestion and gave her the [facility] contact number she agreed to call them

## 2018-07-25 ENCOUNTER — TELEPHONE (OUTPATIENT)
Dept: FAMILY MEDICINE CLINIC | Facility: CLINIC | Age: 58
End: 2018-07-25

## 2018-07-25 NOTE — TELEPHONE ENCOUNTER
Patient's Therapist is calling to speak to nurse regarding patient's cymbalta medication.  Therapist would like to know if Dr. Jaquan Tejeda should see patient to discuss this medication or set up patient to see Psychiatrist. Patient has been having bad side effects

## 2018-07-25 NOTE — TELEPHONE ENCOUNTER
Nick Laughlin called after talking to the patient she was not willing to do partial inpatient or intensive out patient.  The last time MATTHEW got involved with her she did not respond to calls and they were calling her at work and she refused their offers of typ

## 2018-07-26 RX ORDER — FLUOXETINE 10 MG/1
10 TABLET, FILM COATED ORAL DAILY
Qty: 30 TABLET | Refills: 1 | Status: SHIPPED | OUTPATIENT
Start: 2018-07-26 | End: 2018-08-24

## 2018-07-26 NOTE — TELEPHONE ENCOUNTER
Please call Pt and advised that Dr Samantha Diez would like her to come in for appt in the next 7-10 days. Dr Kimberlyn Horton approval to use a SDA appt if needed. Routed to Earnest.

## 2018-07-26 NOTE — TELEPHONE ENCOUNTER
Ok for fluoxetine 10 mg daily. No future appointments. Please arrange follow up in 7 to 10 days with me- ok for same day. Thanks, Bernard Zuleta MD       Signed Prescriptions Disp Refills    FLUoxetine HCl 10 MG Oral Tab 30 tablet 1      Sig: Take 1 tablet (10

## 2018-07-27 NOTE — PROGRESS NOTES
Patient presents with:  Depression: possible side effects from Cymbalta. Pt has not started Fluoxitine      Subjective   HPI:   This is a 66year old female coming in for speech and typing issues, went off Cymbalta, and had this 10-14 days.  Now resolved, l S1 normal, S2 normal and normal heart sounds. No murmur heard. Pulses:       Posterior tibial pulses are 2+ on the right side, and 2+ on the left side. Edema not present.   Pulmonary/Chest: Effort normal and breath sounds normal. No respiratory distr

## 2018-08-03 DIAGNOSIS — I10 ESSENTIAL HYPERTENSION: ICD-10-CM

## 2018-08-03 RX ORDER — LOSARTAN POTASSIUM 50 MG/1
50 TABLET ORAL
Qty: 90 TABLET | Refills: 0 | Status: SHIPPED | OUTPATIENT
Start: 2018-08-03 | End: 2018-11-04

## 2018-08-03 NOTE — TELEPHONE ENCOUNTER
Medication refilled per protocol. Signed Prescriptions Disp Refills    LOSARTAN POTASSIUM 50 MG Oral Tab 90 tablet 0      Sig: TAKE 1 TABLET (50 MG TOTAL) BY MOUTH ONCE DAILY.         Authorizing Provider: Alex Tracy        Ordering User: Sunny Luevano

## 2018-08-24 ENCOUNTER — OFFICE VISIT (OUTPATIENT)
Dept: FAMILY MEDICINE CLINIC | Facility: CLINIC | Age: 58
End: 2018-08-24
Payer: COMMERCIAL

## 2018-08-24 VITALS
BODY MASS INDEX: 31.16 KG/M2 | RESPIRATION RATE: 16 BRPM | TEMPERATURE: 98 F | HEIGHT: 65 IN | WEIGHT: 187 LBS | SYSTOLIC BLOOD PRESSURE: 110 MMHG | HEART RATE: 60 BPM | DIASTOLIC BLOOD PRESSURE: 64 MMHG

## 2018-08-24 DIAGNOSIS — J01.00 ACUTE NON-RECURRENT MAXILLARY SINUSITIS: Primary | ICD-10-CM

## 2018-08-24 DIAGNOSIS — Z12.31 VISIT FOR SCREENING MAMMOGRAM: ICD-10-CM

## 2018-08-24 DIAGNOSIS — J45.30 MILD PERSISTENT ASTHMA WITHOUT COMPLICATION: ICD-10-CM

## 2018-08-24 PROCEDURE — 99214 OFFICE O/P EST MOD 30 MIN: CPT | Performed by: FAMILY MEDICINE

## 2018-08-24 RX ORDER — LEVOFLOXACIN 500 MG/1
500 TABLET, FILM COATED ORAL DAILY
Qty: 10 TABLET | Refills: 0 | Status: SHIPPED | OUTPATIENT
Start: 2018-08-24 | End: 2018-09-03

## 2018-08-24 NOTE — PROGRESS NOTES
Chief Complaint:  Patient presents with:  Cough: x 6 weeks, coughing clear phlegms, gets worse when lying down   Asthma: Breo refilled    HPI:  This is a 66year old female patient presenting for Cough (x 6 weeks, coughing clear phlegms, gets worse when ly Comment: Dr. Alexandria Daniel GI  08/25/2017: EGD      Comment: Dr. Ibrahim Rank GI  No date: OTHER SURGICAL HISTORY      Comment: Dental surgeries  No date: TONSILLECTOMY      Comment:  In childhood   Family History   Problem Relation Age of On Comment:Drugs  Tetracyclines & Rel*    SWELLING  Aspirin                 BLEEDING    Comment:  Dyazide [Hydrochlor*    RASH  Lamotrigine             RASH    Comment:Welts and hair loss  Minocycline             HIVES  Mold                    UNKNOWN  Pristi asthma without complication  Symptoms well controlled. ACT score 17. AAP provided to patient. Continue current medications:   -     Fluticasone Furoate-Vilanterol (BREO ELLIPTA) 200-25 MCG/INH Inhalation Aerosol Powder, Breath Activated;  Inhale 1 puff into

## 2018-08-29 ENCOUNTER — OFFICE VISIT (OUTPATIENT)
Dept: FAMILY MEDICINE CLINIC | Facility: CLINIC | Age: 58
End: 2018-08-29
Payer: COMMERCIAL

## 2018-08-29 VITALS
HEIGHT: 65.5 IN | TEMPERATURE: 98 F | WEIGHT: 188 LBS | BODY MASS INDEX: 30.95 KG/M2 | SYSTOLIC BLOOD PRESSURE: 130 MMHG | RESPIRATION RATE: 16 BRPM | DIASTOLIC BLOOD PRESSURE: 74 MMHG | HEART RATE: 80 BPM

## 2018-08-29 DIAGNOSIS — J45.21 MILD INTERMITTENT ASTHMA WITH EXACERBATION: ICD-10-CM

## 2018-08-29 DIAGNOSIS — J20.9 ACUTE BRONCHITIS, UNSPECIFIED ORGANISM: Primary | ICD-10-CM

## 2018-08-29 PROCEDURE — 99213 OFFICE O/P EST LOW 20 MIN: CPT | Performed by: FAMILY MEDICINE

## 2018-08-29 RX ORDER — PREDNISONE 20 MG/1
TABLET ORAL
Qty: 10 TABLET | Refills: 0 | Status: SHIPPED | OUTPATIENT
Start: 2018-08-29 | End: 2018-09-05 | Stop reason: ALTCHOICE

## 2018-08-29 NOTE — PATIENT INSTRUCTIONS
What Is Acute Bronchitis? Acute bronchitis is when the airways in your lungs (bronchial tubes) become red and swollen (inflamed). It is usually caused by a viral infection. But it can also occur because of a bacteria or allergen.  Symptoms include a coug · A chest X-ray. This is done if your healthcare provider thinks you have pneumonia. · Tests to check for an underlying condition. Other tests may be done to check for things such as allergies, asthma, or COPD (chronic obstructive pulmonary disease).  You · Take the medicines as directed. For instance, some medicines should be taken with food. · Ask about side effects. Ask your provider or pharmacist what side effects are common, and what to do about them.   Follow-up care  You should see your provider jonathan

## 2018-08-29 NOTE — PROGRESS NOTES
Kristi Raymundo is a 66year old female. S:  Patient presents today with the following concerns:  · Cough for 2 months. Started as a respiratory infection with productive cough. Saw Dr. See Ellsworth last week and dx with sinusitis.   Had a lot of mucous Essential hypertension     Borderline diabetes     Vitamin D deficiency     Colon polyps     Gastric polyps     Hiatal hernia    Family History   Problem Relation Age of Onset   • Arthritis Mother    • Cancer Mother      Lung   • Other [place] Mother    • need to start prednisone. Prescribed as above. Discussed side effects, adverse reactions, expectations. Continue other medications already prescribed by Dr. Kavon Kamara. Follow up if symptoms worsen or do not improve. Go to ED with severe dyspnea.   Patient

## 2018-09-04 RX ORDER — LEVOTHYROXINE SODIUM 112 UG/1
TABLET ORAL
Qty: 90 TABLET | Refills: 1 | Status: SHIPPED | OUTPATIENT
Start: 2018-09-04 | End: 2018-11-07

## 2018-09-05 RX ORDER — LEVOTHYROXINE SODIUM 112 UG/1
TABLET ORAL
Qty: 90 TABLET | Refills: 1 | Status: SHIPPED | OUTPATIENT
Start: 2018-09-05 | End: 2018-09-15

## 2018-09-05 NOTE — PROGRESS NOTES
Patient presents with:  Depression: weaned off Cymbalta- have not felt depressed- did not start the Fluoxetine- PHQ-9 given  Anxiety: noticed s little more Anxiety- but does not want to start medication  Asthma: follow up from visiit last week- feeling bet index is 30.94 kg/m². Physical Exam   Nursing note and vitals reviewed. Constitutional: She is oriented to person, place, and time. She appears well-developed and well-nourished. No distress. HENT:   Head: Normocephalic.    Neck: Normal range of motio

## 2018-09-15 ENCOUNTER — HOSPITAL ENCOUNTER (OUTPATIENT)
Age: 58
Discharge: HOME | End: 2018-09-15
Attending: EMERGENCY MEDICINE
Payer: COMMERCIAL

## 2018-09-15 ENCOUNTER — APPOINTMENT (OUTPATIENT)
Dept: GENERAL RADIOLOGY | Age: 58
End: 2018-09-15
Attending: EMERGENCY MEDICINE
Payer: COMMERCIAL

## 2018-09-15 VITALS
SYSTOLIC BLOOD PRESSURE: 140 MMHG | HEART RATE: 82 BPM | BODY MASS INDEX: 29.83 KG/M2 | HEIGHT: 66 IN | RESPIRATION RATE: 14 BRPM | DIASTOLIC BLOOD PRESSURE: 80 MMHG | OXYGEN SATURATION: 100 % | WEIGHT: 185.63 LBS | TEMPERATURE: 98 F

## 2018-09-15 DIAGNOSIS — R05.9 COUGH: Primary | ICD-10-CM

## 2018-09-15 LAB
#LYMPHOCYTE IC: 1.6 X10ˆ3/UL (ref 0.9–3.2)
#MXD IC: 0.5 X10ˆ3/UL (ref 0.1–1)
#NEUTROPHIL IC: 4.8 X10ˆ3/UL (ref 1.3–6.7)
HCT IC: 40.7 % (ref 37–54)
HGB IC: 13.4 G/DL (ref 11.7–16)
LYMPHOCYTES NFR BLD AUTO: 23.1 %
MCH IC: 28.3 PG (ref 27–33.2)
MCHC IC: 32.9 G/DL (ref 31–37)
MCV IC: 86 FL (ref 81–100)
MIXED CELL %: 7.2 %
NEUTROPHILS NFR BLD AUTO: 69.7 %
PLT IC: 237 X10ˆ3/UL (ref 150–450)
RBC IC: 4.73 X10ˆ6/UL (ref 3.8–5.1)
WBC IC: 6.9 X10ˆ3/UL (ref 4–13)

## 2018-09-15 PROCEDURE — 99213 OFFICE O/P EST LOW 20 MIN: CPT

## 2018-09-15 PROCEDURE — 36415 COLL VENOUS BLD VENIPUNCTURE: CPT

## 2018-09-15 PROCEDURE — 85025 COMPLETE CBC W/AUTO DIFF WBC: CPT | Performed by: EMERGENCY MEDICINE

## 2018-09-15 PROCEDURE — 71046 X-RAY EXAM CHEST 2 VIEWS: CPT | Performed by: EMERGENCY MEDICINE

## 2018-09-15 PROCEDURE — 99214 OFFICE O/P EST MOD 30 MIN: CPT

## 2018-09-15 RX ORDER — BENZONATATE 100 MG/1
100 CAPSULE ORAL 3 TIMES DAILY PRN
Qty: 30 CAPSULE | Refills: 0 | Status: SHIPPED | OUTPATIENT
Start: 2018-09-15 | End: 2018-10-15

## 2018-09-15 NOTE — ED PROVIDER NOTES
Patient Seen in: [address]    History     Stated Complaint: Cough and trouble breathing.  Secondary concern is right foot has a lump     HPI    Patient complains of a cough which she has had for several weeks of worsenin Secondary concern is right foot has a lump   Other systems are as noted in HPI.   Constitutional no fever  Eyes no drainage  ENT no sore throat  Respiratory positive cough  Cardiac no chest pain  GI no vomiting  Extremities positive localized swelling over pulmonary parenchymal abnormalities. No effusion or pleural thickening. BONES: Minimal scoliosis. Minimal osteoarthritic changes are seen in the spine and both shoulders. OTHER: Negative. CONCLUSION:  1. Normal heart and lungs. 2. Scoliosis.  3. Lissette Dys

## 2018-09-15 NOTE — ED INITIAL ASSESSMENT (HPI)
Pt presents to the IC with c/o a cough for the last 2 months. Pt was seen and placed on Levaquin 2 weeks ago without improvement, then prescribed prednisone with mild improvement. Pt notes the cough is clear and foamy.  Pt is not sure if she's had a fever a

## 2018-09-19 ENCOUNTER — TELEPHONE (OUTPATIENT)
Dept: FAMILY MEDICINE CLINIC | Facility: CLINIC | Age: 58
End: 2018-09-19

## 2018-09-19 NOTE — TELEPHONE ENCOUNTER
shauna called back and she has a cough and some wheeze so I suggested she wait until she got better to get the flu immunization

## 2018-09-19 NOTE — TELEPHONE ENCOUNTER
Spoke to pt who requested to speak to a nurse regarding getting a flu shot. Pt is experiencing a flare-up with asthma and seeking medical advice prior to getting the flu vaccine.

## 2018-09-26 ENCOUNTER — OFFICE VISIT (OUTPATIENT)
Dept: FAMILY MEDICINE CLINIC | Facility: CLINIC | Age: 58
End: 2018-09-26
Payer: COMMERCIAL

## 2018-09-26 VITALS
TEMPERATURE: 98 F | OXYGEN SATURATION: 98 % | BODY MASS INDEX: 30 KG/M2 | RESPIRATION RATE: 20 BRPM | SYSTOLIC BLOOD PRESSURE: 128 MMHG | DIASTOLIC BLOOD PRESSURE: 76 MMHG | HEART RATE: 79 BPM | WEIGHT: 187.81 LBS

## 2018-09-26 DIAGNOSIS — J45.21 EXACERBATION OF INTERMITTENT ASTHMA, UNSPECIFIED ASTHMA SEVERITY: Primary | ICD-10-CM

## 2018-09-26 DIAGNOSIS — R05.9 COUGH: ICD-10-CM

## 2018-09-26 PROCEDURE — 99214 OFFICE O/P EST MOD 30 MIN: CPT | Performed by: FAMILY MEDICINE

## 2018-09-26 RX ORDER — BENZONATATE 200 MG/1
200 CAPSULE ORAL 3 TIMES DAILY PRN
Qty: 40 CAPSULE | Refills: 1 | Status: SHIPPED | OUTPATIENT
Start: 2018-09-26 | End: 2018-12-17

## 2018-09-26 RX ORDER — FAMOTIDINE 20 MG/1
20 TABLET ORAL 2 TIMES DAILY
COMMUNITY
End: 2018-12-17

## 2018-09-26 RX ORDER — MONTELUKAST SODIUM 10 MG/1
10 TABLET ORAL DAILY
Qty: 30 TABLET | Refills: 5 | Status: SHIPPED | OUTPATIENT
Start: 2018-09-26 | End: 2018-12-17

## 2018-09-26 NOTE — PROGRESS NOTES
Dharmesh Martinez is a 66year old female. S:  Patient presents today with the following concerns:  · Still coughing for a couple of months. Cough is somewhat productive. Wheezing. Used rescue inhaler 2 puffs last night.     · Hurts in chest since mouth once a week.  Disp: 12 capsule Rfl: 3     Patient Active Problem List:     Major depression in partial remission (HCC)     Rosacea     Hypothyroidism (acquired)     Mild persistent asthma     Primary localized osteoarthrosis, hand     Prediabetes this Visit:  Requested Prescriptions     Signed Prescriptions Disp Refills   • Montelukast Sodium (SINGULAIR) 10 MG Oral Tab 30 tablet 5     Sig: Take 1 tablet (10 mg total) by mouth daily.    • benzonatate 200 MG Oral Cap 40 capsule 1     Sig: Take 1 capsu

## 2018-11-04 DIAGNOSIS — I10 ESSENTIAL HYPERTENSION: ICD-10-CM

## 2018-11-05 RX ORDER — LOSARTAN POTASSIUM 50 MG/1
TABLET ORAL
Qty: 90 TABLET | Refills: 0 | Status: SHIPPED | OUTPATIENT
Start: 2018-11-05 | End: 2018-11-07

## 2018-11-05 NOTE — TELEPHONE ENCOUNTER
Medication refilled per protocol.      Requested Prescriptions     Signed Prescriptions Disp Refills   • LOSARTAN POTASSIUM 50 MG Oral Tab 90 tablet 0     Sig: TAKE 1 TABLET BY MOUTH EVERY DAY     Authorizing Provider: Caitlyn Varela     Ordering User: Cely Artis

## 2018-11-07 ENCOUNTER — OFFICE VISIT (OUTPATIENT)
Dept: FAMILY MEDICINE CLINIC | Facility: CLINIC | Age: 58
End: 2018-11-07
Payer: COMMERCIAL

## 2018-11-07 VITALS
RESPIRATION RATE: 16 BRPM | HEIGHT: 65.5 IN | BODY MASS INDEX: 30.95 KG/M2 | SYSTOLIC BLOOD PRESSURE: 118 MMHG | DIASTOLIC BLOOD PRESSURE: 76 MMHG | HEART RATE: 79 BPM | TEMPERATURE: 98 F | WEIGHT: 188 LBS

## 2018-11-07 DIAGNOSIS — Z11.59 ENCOUNTER FOR HEPATITIS C SCREENING TEST FOR LOW RISK PATIENT: ICD-10-CM

## 2018-11-07 DIAGNOSIS — Z00.00 LABORATORY EXAMINATION ORDERED AS PART OF A ROUTINE GENERAL MEDICAL EXAMINATION: ICD-10-CM

## 2018-11-07 DIAGNOSIS — I10 ESSENTIAL HYPERTENSION: Primary | ICD-10-CM

## 2018-11-07 DIAGNOSIS — R73.03 PREDIABETES: ICD-10-CM

## 2018-11-07 DIAGNOSIS — F33.41 RECURRENT MAJOR DEPRESSIVE DISORDER, IN PARTIAL REMISSION (HCC): ICD-10-CM

## 2018-11-07 DIAGNOSIS — J45.30 MILD PERSISTENT ASTHMA WITHOUT COMPLICATION: ICD-10-CM

## 2018-11-07 DIAGNOSIS — E03.9 HYPOTHYROIDISM (ACQUIRED): ICD-10-CM

## 2018-11-07 DIAGNOSIS — J01.10 ACUTE NON-RECURRENT FRONTAL SINUSITIS: ICD-10-CM

## 2018-11-07 PROCEDURE — 90471 IMMUNIZATION ADMIN: CPT | Performed by: FAMILY MEDICINE

## 2018-11-07 PROCEDURE — 90686 IIV4 VACC NO PRSV 0.5 ML IM: CPT | Performed by: FAMILY MEDICINE

## 2018-11-07 PROCEDURE — 99214 OFFICE O/P EST MOD 30 MIN: CPT | Performed by: FAMILY MEDICINE

## 2018-11-07 RX ORDER — LEVOTHYROXINE SODIUM 112 UG/1
112 TABLET ORAL
Qty: 90 TABLET | Refills: 3 | Status: SHIPPED | OUTPATIENT
Start: 2018-11-07 | End: 2019-04-01

## 2018-11-07 RX ORDER — CEFUROXIME AXETIL 250 MG/1
250 TABLET ORAL 2 TIMES DAILY
Qty: 20 TABLET | Refills: 0 | Status: SHIPPED | OUTPATIENT
Start: 2018-11-07 | End: 2018-11-17

## 2018-11-07 RX ORDER — FLUCONAZOLE 150 MG/1
150 TABLET ORAL
Qty: 2 TABLET | Refills: 0 | Status: SHIPPED | OUTPATIENT
Start: 2018-11-07 | End: 2018-11-10

## 2018-11-07 RX ORDER — LOSARTAN POTASSIUM 50 MG/1
50 TABLET ORAL
Qty: 90 TABLET | Refills: 3 | Status: SHIPPED | OUTPATIENT
Start: 2018-11-07 | End: 2019-10-28

## 2018-11-07 RX ORDER — ALBUTEROL SULFATE 90 UG/1
2 AEROSOL, METERED RESPIRATORY (INHALATION) EVERY 6 HOURS PRN
Qty: 1 INHALER | Refills: 5 | Status: SHIPPED | OUTPATIENT
Start: 2018-11-07 | End: 2018-12-17

## 2018-11-07 NOTE — ASSESSMENT & PLAN NOTE
Stable, Continue present management.     Thyroid  (most recent labs)   Lab Results   Component Value Date/Time    TSH 1.86 03/10/2018 08:36 AM    T4F 1.1 03/10/2018 08:36 AM    TSHT4 5.89 (H) 08/11/2015 07:38 AM         Endocrine Medications          Levoth

## 2018-11-07 NOTE — PROGRESS NOTES
Patient presents with:  Depression: F/u   Sinus Problem: Has not seen pulmonologist, still coughing, refill on inhaler   Thyroid Problem: Medication Refills  PND sx, not better with sufaed.  Sx for 4 weeks, had zpack at that time but worse agon    Subjectiv throat. Eyes: Negative. Respiratory: Positive for cough. Negative for shortness of breath. Cardiovascular: Negative. Negative for chest pain and palpitations. Gastrointestinal: Negative. Negative for abdominal pain. Endocrine: Negative.     G dry.   Psychiatric: Judgment and thought content normal. Her mood appears anxious.             Assessment and Plan:     Problem List Items Addressed This Visit        Cardiovascular    Essential hypertension - Primary    Overview     losartan 50         Cur lose weight by increased dietary compliance and exercise and will check labs as ordered.     Lab Results   Component Value Date    A1C 4.6 04/26/2018    A1C 5.6 03/10/2018                    Other Visit Diagnoses     Acute non-recurrent frontal sinusitis

## 2018-11-07 NOTE — ASSESSMENT & PLAN NOTE
CBT helping, continue to watch, but would try something different. Fluoxetine if no change, good coping skills, and added light box.

## 2018-12-17 ENCOUNTER — OFFICE VISIT (OUTPATIENT)
Dept: FAMILY MEDICINE CLINIC | Facility: CLINIC | Age: 58
End: 2018-12-17
Payer: COMMERCIAL

## 2018-12-17 VITALS
RESPIRATION RATE: 16 BRPM | WEIGHT: 192 LBS | DIASTOLIC BLOOD PRESSURE: 62 MMHG | HEIGHT: 65.5 IN | HEART RATE: 80 BPM | TEMPERATURE: 98 F | BODY MASS INDEX: 31.6 KG/M2 | SYSTOLIC BLOOD PRESSURE: 124 MMHG

## 2018-12-17 DIAGNOSIS — R05.3 CHRONIC COUGH: Primary | ICD-10-CM

## 2018-12-17 PROCEDURE — 99213 OFFICE O/P EST LOW 20 MIN: CPT | Performed by: FAMILY MEDICINE

## 2018-12-17 RX ORDER — PREDNISONE 10 MG/1
TABLET ORAL
Qty: 40 TABLET | Refills: 0 | Status: SHIPPED | OUTPATIENT
Start: 2018-12-17 | End: 2019-03-16 | Stop reason: ALTCHOICE

## 2018-12-17 NOTE — PROGRESS NOTES
Patient presents with:  Cough: x 5 months      Subjective   HPI:   This is a 66year old female coming in for 5 months of cough, PND as well, better after Ceftin last month, but back. Better with something to suck on.     HPI   See reviewed tab for PMSFHx rhythm and normal heart sounds. No murmur heard. Pulmonary/Chest: Effort normal and breath sounds normal. No respiratory distress. Abdominal: Soft. Neurological: She is alert and oriented to person, place, and time. Skin: Skin is warm and dry.

## 2018-12-22 ENCOUNTER — HOSPITAL ENCOUNTER (OUTPATIENT)
Dept: MAMMOGRAPHY | Age: 58
Discharge: HOME | End: 2018-12-22
Attending: FAMILY MEDICINE
Payer: COMMERCIAL

## 2018-12-22 DIAGNOSIS — Z12.31 VISIT FOR SCREENING MAMMOGRAM: ICD-10-CM

## 2018-12-22 PROCEDURE — 77067 SCR MAMMO BI INCL CAD: CPT | Performed by: FAMILY MEDICINE

## 2018-12-26 ENCOUNTER — TELEPHONE (OUTPATIENT)
Dept: FAMILY MEDICINE CLINIC | Facility: CLINIC | Age: 58
End: 2018-12-26

## 2018-12-26 NOTE — TELEPHONE ENCOUNTER
Notified pt that she needs to call Central Scheduling to schedule additional views. She verbalized understanding.

## 2018-12-28 ENCOUNTER — RT VISIT (OUTPATIENT)
Dept: RESPIRATORY THERAPY | Facility: HOSPITAL | Age: 58
End: 2018-12-28
Attending: FAMILY MEDICINE
Payer: COMMERCIAL

## 2018-12-28 DIAGNOSIS — R05.3 CHRONIC COUGH: ICD-10-CM

## 2019-01-02 NOTE — PROCEDURES
Spirometry and flow volume loop appear normal with no evidence of airway obstruction     Normal TLC, no evidence of restriction    The diffusing capacity is normal

## 2019-01-07 ENCOUNTER — ORDER TRANSCRIPTION (OUTPATIENT)
Dept: ADMINISTRATIVE | Facility: HOSPITAL | Age: 59
End: 2019-01-07

## 2019-01-07 DIAGNOSIS — J32.9 CHRONIC SINUSITIS: ICD-10-CM

## 2019-01-07 DIAGNOSIS — R05.3 CHRONIC COUGH: Primary | ICD-10-CM

## 2019-01-09 ENCOUNTER — HOSPITAL ENCOUNTER (OUTPATIENT)
Dept: MAMMOGRAPHY | Facility: HOSPITAL | Age: 59
Discharge: HOME | End: 2019-01-09
Attending: FAMILY MEDICINE
Payer: COMMERCIAL

## 2019-01-09 DIAGNOSIS — R92.2 INCONCLUSIVE MAMMOGRAM: ICD-10-CM

## 2019-01-09 PROCEDURE — 77065 DX MAMMO INCL CAD UNI: CPT | Performed by: FAMILY MEDICINE

## 2019-01-09 PROCEDURE — 76642 ULTRASOUND BREAST LIMITED: CPT | Performed by: FAMILY MEDICINE

## 2019-01-09 PROCEDURE — 77061 BREAST TOMOSYNTHESIS UNI: CPT | Performed by: FAMILY MEDICINE

## 2019-01-10 DIAGNOSIS — R92.8 ABNORMAL MAMMOGRAM OF LEFT BREAST: Primary | ICD-10-CM

## 2019-03-16 ENCOUNTER — OFFICE VISIT (OUTPATIENT)
Dept: FAMILY MEDICINE CLINIC | Facility: CLINIC | Age: 59
End: 2019-03-16
Payer: COMMERCIAL

## 2019-03-16 ENCOUNTER — TELEPHONE (OUTPATIENT)
Dept: FAMILY MEDICINE CLINIC | Facility: CLINIC | Age: 59
End: 2019-03-16

## 2019-03-16 VITALS
RESPIRATION RATE: 16 BRPM | BODY MASS INDEX: 31.19 KG/M2 | HEART RATE: 76 BPM | HEIGHT: 65 IN | TEMPERATURE: 99 F | SYSTOLIC BLOOD PRESSURE: 128 MMHG | DIASTOLIC BLOOD PRESSURE: 78 MMHG | WEIGHT: 187.19 LBS

## 2019-03-16 DIAGNOSIS — R05.9 COUGH: Primary | ICD-10-CM

## 2019-03-16 DIAGNOSIS — J45.30 MILD PERSISTENT ASTHMA WITHOUT COMPLICATION: ICD-10-CM

## 2019-03-16 PROCEDURE — 99213 OFFICE O/P EST LOW 20 MIN: CPT | Performed by: FAMILY MEDICINE

## 2019-03-16 RX ORDER — AZELASTINE HYDROCHLORIDE 137 UG/1
SPRAY, METERED NASAL AS NEEDED
Refills: 3 | COMMUNITY
Start: 2019-01-07 | End: 2019-09-23 | Stop reason: ALTCHOICE

## 2019-03-16 RX ORDER — BENZONATATE 200 MG/1
200 CAPSULE ORAL 3 TIMES DAILY PRN
Qty: 40 CAPSULE | Refills: 1 | Status: SHIPPED | OUTPATIENT
Start: 2019-03-16 | End: 2019-04-29 | Stop reason: ALTCHOICE

## 2019-03-16 NOTE — TELEPHONE ENCOUNTER
Patient c/o chest congestion, diarrhea, cough for several days. Patient reports a pressure sensation in chest. She believes she may have pulled a muscle from coughing. Patient requesting cough medication.  Suggest patient come in for eval. Appt given for th

## 2019-03-16 NOTE — TELEPHONE ENCOUNTER
Patient called to request a refill on Benzonatate that she was given last time she was sick. Patient has been sick all week with what she thinks may have been the flu.  Patient has been congested, lots of coughing, throat on fire and feels like a ton of [place]

## 2019-03-16 NOTE — PROGRESS NOTES
Patient presents with:  Flu: Beginning 5 days ago c/o cough and congestion. and upper chest soreness from coughing. 2 days ago low grade fever nausea and diarrhea lasting 2 days.   Medication Request: Asking for benzonatate refill for cough  Asthma: ACT Scor Asthma (ACT Score 6/25   AAP updated and pended); and Cramping (Occurring in R foot off and on over the past few momth).     1. Cough  Add tessalon to the albuterol, as this helped before  I think she would benefit from steroids, but hoping to avoid as she

## 2019-03-22 ENCOUNTER — RT VISIT (OUTPATIENT)
Dept: RESPIRATORY THERAPY | Facility: HOSPITAL | Age: 59
End: 2019-03-22
Attending: INTERNAL MEDICINE
Payer: COMMERCIAL

## 2019-03-22 DIAGNOSIS — J32.9 CHRONIC SINUSITIS: ICD-10-CM

## 2019-03-22 DIAGNOSIS — R05.3 CHRONIC COUGH: ICD-10-CM

## 2019-03-22 PROCEDURE — 94070 EVALUATION OF WHEEZING: CPT

## 2019-03-22 NOTE — PROCEDURES
Stef Atwood is a 57-year-old  female who stands 5 feet 6 inches tall and weighs 185 pounds. She underwent methacholine challenge testing on 3/22/19. She carries a diagnosis of chronic cough. No smoking history is recorded.   Results are a

## 2019-03-25 LAB
ALBUMIN/GLOBULIN RATIO: 1.6 (CALC) (ref 1–2.5)
ALBUMIN: 4 G/DL (ref 3.6–5.1)
ALKALINE PHOSPHATASE: 95 U/L (ref 33–130)
ALT: 26 U/L (ref 6–29)
AST: 21 U/L (ref 10–35)
BILIRUBIN, TOTAL: 0.4 MG/DL (ref 0.2–1.2)
BUN: 11 MG/DL (ref 7–25)
CALCIUM: 9.4 MG/DL (ref 8.6–10.4)
CARBON DIOXIDE: 25 MMOL/L (ref 20–32)
CHLORIDE: 107 MMOL/L (ref 98–110)
CHOL/HDLC RATIO: 4.3 (CALC)
CHOLESTEROL, TOTAL: 207 MG/DL
CREATININE: 0.81 MG/DL (ref 0.5–1.05)
EGFR IF AFRICN AM: 94 ML/MIN/1.73M2
EGFR IF NONAFRICN AM: 81 ML/MIN/1.73M2
GLOBULIN: 2.5 G/DL (CALC) (ref 1.9–3.7)
GLUCOSE: 101 MG/DL (ref 65–99)
HDL CHOLESTEROL: 48 MG/DL
HEMATOCRIT: 43 % (ref 35–45)
HEMOGLOBIN: 14.2 G/DL (ref 11.7–15.5)
LDL-CHOLESTEROL: 125 MG/DL (CALC)
MCH: 27.8 PG (ref 27–33)
MCHC: 33 G/DL (ref 32–36)
MCV: 84.1 FL (ref 80–100)
MPV: 10.9 FL (ref 7.5–12.5)
NON-HDL CHOLESTEROL: 159 MG/DL (CALC)
PLATELET COUNT: 259 THOUSAND/UL (ref 140–400)
POTASSIUM: 4.6 MMOL/L (ref 3.5–5.3)
PROTEIN, TOTAL: 6.5 G/DL (ref 6.1–8.1)
RDW: 14.1 % (ref 11–15)
RED BLOOD CELL COUNT: 5.11 MILLION/UL (ref 3.8–5.1)
SIGNAL TO CUT-OFF: 0.01
SODIUM: 140 MMOL/L (ref 135–146)
T4, FREE: 1.1 NG/DL (ref 0.8–1.8)
TRIGLYCERIDES: 220 MG/DL
TSH W/REFLEX TO FT4: 10.19 MIU/L (ref 0.4–4.5)
WHITE BLOOD CELL COUNT: 5.5 THOUSAND/UL (ref 3.8–10.8)

## 2019-04-01 DIAGNOSIS — F33.41 RECURRENT MAJOR DEPRESSIVE DISORDER, IN PARTIAL REMISSION (HCC): ICD-10-CM

## 2019-04-01 RX ORDER — LEVOTHYROXINE SODIUM 0.12 MG/1
125 TABLET ORAL
Qty: 90 TABLET | Refills: 1 | Status: SHIPPED | OUTPATIENT
Start: 2019-04-01 | End: 2019-09-22

## 2019-04-01 NOTE — PROGRESS NOTES
Requested Prescriptions     Signed Prescriptions Disp Refills   • Levothyroxine Sodium 125 MCG Oral Tab 90 tablet 1     Sig: Take 1 tablet (125 mcg total) by mouth once daily.       Increased form 112

## 2019-04-03 ENCOUNTER — OFFICE VISIT (OUTPATIENT)
Dept: FAMILY MEDICINE CLINIC | Facility: CLINIC | Age: 59
End: 2019-04-03
Payer: COMMERCIAL

## 2019-04-03 VITALS
OXYGEN SATURATION: 98 % | DIASTOLIC BLOOD PRESSURE: 70 MMHG | HEART RATE: 96 BPM | SYSTOLIC BLOOD PRESSURE: 130 MMHG | RESPIRATION RATE: 16 BRPM | TEMPERATURE: 99 F

## 2019-04-03 DIAGNOSIS — M54.6 ACUTE RIGHT-SIDED THORACIC BACK PAIN: Primary | ICD-10-CM

## 2019-04-03 DIAGNOSIS — E55.9 VITAMIN D DEFICIENCY: ICD-10-CM

## 2019-04-03 PROCEDURE — 99213 OFFICE O/P EST LOW 20 MIN: CPT | Performed by: PHYSICIAN ASSISTANT

## 2019-04-03 PROCEDURE — 81003 URINALYSIS AUTO W/O SCOPE: CPT | Performed by: PHYSICIAN ASSISTANT

## 2019-04-03 RX ORDER — CYCLOBENZAPRINE HCL 10 MG
10 TABLET ORAL 3 TIMES DAILY PRN
Qty: 30 TABLET | Refills: 0 | Status: SHIPPED | OUTPATIENT
Start: 2019-04-03 | End: 2019-04-29 | Stop reason: ALTCHOICE

## 2019-04-03 RX ORDER — IBUPROFEN 600 MG/1
600 TABLET ORAL EVERY 6 HOURS PRN
Qty: 30 TABLET | Refills: 0 | Status: SHIPPED | OUTPATIENT
Start: 2019-04-03 | End: 2019-04-29 | Stop reason: ALTCHOICE

## 2019-04-03 RX ORDER — ERGOCALCIFEROL 1.25 MG/1
50000 CAPSULE ORAL WEEKLY
Qty: 4 CAPSULE | Refills: 4 | Status: SHIPPED | OUTPATIENT
Start: 2019-04-03 | End: 2019-09-05

## 2019-04-03 NOTE — TELEPHONE ENCOUNTER
LOV 3/16/2019     Future Appointments   Date Time Provider Letty Avendaño   4/29/2019 12:00 PM Caprice Godinez MD [facility]   5/14/2019  3:20 PM Margaux Tao MD [id number]   6/7/2019  1:30 PM Gabriele Gao MD [facility] [place] [address]

## 2019-04-03 NOTE — TELEPHONE ENCOUNTER
Patient is wanting to know if Dr. Estela Pierce can take over refilling vitamin D medication. She see's another doctor but figured that since Dr. Estela Pierce takes care of her thyroid medication he would be able to take care of her vitamin d.

## 2019-04-03 NOTE — PROGRESS NOTES
CHIEF COMPLAINT:     Patient presents with:  Back Pain      HPI:     Dedrick Cornejo is a 66year old female who is here for complaints of back pain.  Developed acute pain in the right lower middle back area this morning that worsened  after she arrive mouth 3 (three) times daily as needed for cough. Disp: 40 capsule Rfl: 1   Losartan Potassium 50 MG Oral Tab Take 1 tablet (50 mg total) by mouth once daily.  Disp: 90 tablet Rfl: 3   Clobetasol Propionate 0.05 % External Solution Apply topically 2 (two) ti ecchymosis or deformity.     Range of Motion:  lumbothoracic full ROM but pain with rotation and lateral flexion    Palpation:  Tender over right TL spine and soft tissues    Nerve Root Signs:  Right SLR neg  Left SLR neg    GERARDO's   Right neg  Left neg

## 2019-04-08 ENCOUNTER — TELEPHONE (OUTPATIENT)
Dept: FAMILY MEDICINE CLINIC | Facility: CLINIC | Age: 59
End: 2019-04-08

## 2019-04-08 ENCOUNTER — OFFICE VISIT (OUTPATIENT)
Dept: FAMILY MEDICINE CLINIC | Facility: CLINIC | Age: 59
End: 2019-04-08
Payer: COMMERCIAL

## 2019-04-08 VITALS
HEIGHT: 65 IN | TEMPERATURE: 98 F | HEART RATE: 68 BPM | BODY MASS INDEX: 31.65 KG/M2 | DIASTOLIC BLOOD PRESSURE: 80 MMHG | WEIGHT: 190 LBS | SYSTOLIC BLOOD PRESSURE: 130 MMHG | RESPIRATION RATE: 16 BRPM

## 2019-04-08 DIAGNOSIS — R53.82 CHRONIC FATIGUE, UNSPECIFIED: Primary | ICD-10-CM

## 2019-04-08 DIAGNOSIS — R05.9 COUGH: ICD-10-CM

## 2019-04-08 PROCEDURE — 99213 OFFICE O/P EST LOW 20 MIN: CPT | Performed by: FAMILY MEDICINE

## 2019-04-08 RX ORDER — CEFUROXIME AXETIL 250 MG/1
250 TABLET ORAL 2 TIMES DAILY
Qty: 20 TABLET | Refills: 0 | Status: SHIPPED | OUTPATIENT
Start: 2019-04-08 | End: 2019-04-18

## 2019-04-08 RX ORDER — LEVOCETIRIZINE DIHYDROCHLORIDE 5 MG/1
5 TABLET, FILM COATED ORAL EVERY EVENING
Qty: 30 TABLET | Refills: 0 | COMMUNITY
Start: 2019-04-08 | End: 2019-09-23 | Stop reason: ALTCHOICE

## 2019-04-08 NOTE — TELEPHONE ENCOUNTER
Levothyroxine was increased from 112 mcg to 125 mcg has gained 4 pounds in a week and feel fatigued. The  chronic cough she has had seems to be better.  I will ask Dr Samantha Diez if he wants to see her for this complaint

## 2019-04-08 NOTE — PROGRESS NOTES
Patient presents with:  Fatigue: x 1 week  Weight Problem: weight gain  Asthma: ACT 9      Subjective   HPI:   This is a 66year old female coming in for severe fatigue. Seeing pulmonology as well, ACT 9. Seeing pulmonary as well. Back pain as well.  Flex present. No oropharyngeal exudate. Eyes: Conjunctivae are normal.   Neck: Normal range of motion. Neck supple. Cardiovascular: Normal rate, regular rhythm and normal heart sounds. No murmur heard.   Pulmonary/Chest: Effort normal and breath sounds no

## 2019-04-08 NOTE — TELEPHONE ENCOUNTER
Patient is calling stating since Dr. Sofia Asher  Increase levothyroxine dosage patient has gained 4 pounds and feels exhausted. Patient requesting to speak to nurse.

## 2019-04-29 ENCOUNTER — OFFICE VISIT (OUTPATIENT)
Dept: FAMILY MEDICINE CLINIC | Facility: CLINIC | Age: 59
End: 2019-04-29
Payer: COMMERCIAL

## 2019-04-29 VITALS
RESPIRATION RATE: 16 BRPM | HEART RATE: 80 BPM | TEMPERATURE: 98 F | WEIGHT: 189 LBS | HEIGHT: 65 IN | BODY MASS INDEX: 31.49 KG/M2 | DIASTOLIC BLOOD PRESSURE: 74 MMHG | SYSTOLIC BLOOD PRESSURE: 130 MMHG

## 2019-04-29 DIAGNOSIS — J45.31 MILD PERSISTENT ASTHMA WITH ACUTE EXACERBATION: ICD-10-CM

## 2019-04-29 DIAGNOSIS — R73.03 PREDIABETES: ICD-10-CM

## 2019-04-29 DIAGNOSIS — Z00.00 ANNUAL PHYSICAL EXAM: Primary | ICD-10-CM

## 2019-04-29 DIAGNOSIS — E03.9 HYPOTHYROIDISM (ACQUIRED): ICD-10-CM

## 2019-04-29 DIAGNOSIS — F33.41 RECURRENT MAJOR DEPRESSIVE DISORDER, IN PARTIAL REMISSION (HCC): ICD-10-CM

## 2019-04-29 DIAGNOSIS — I10 ESSENTIAL HYPERTENSION: ICD-10-CM

## 2019-04-29 PROCEDURE — 99396 PREV VISIT EST AGE 40-64: CPT | Performed by: FAMILY MEDICINE

## 2019-04-29 NOTE — PROGRESS NOTES
Dharmesh Martinez is a 66year old female who presents for a complete physical exam.   HPI:   Patient presents with:  Physical  Numbness: R arm x 2 -3 weeks     Her last annual assessment has been over 1 year: Annual Physical due on 09/11/2018 Component Value Date/Time    CHOLEST 207 (H) 03/23/2019 08:11 AM    HDL 48 (L) 03/23/2019 08:11 AM    TRIG 220 (H) 03/23/2019 08:11 AM     (H) 03/23/2019 08:11 AM    NONHDLC 159 (H) 03/23/2019 08:11 AM       Lab Results   Component Value Date/Time used smokeless tobacco. She reports that she drinks alcohol. She reports that she does not use drugs. Exercise: once per week.   Diet: watches minimally    GYNE HISTORY: Menstrual History:  OB History     T0    L0    SAB0  TAB0  Ectopic0  Mul edema present. No thyroid mass and no thyromegaly present. Cardiovascular: Normal rate, regular rhythm, S1 normal, S2 normal, normal heart sounds and intact distal pulses. Exam reveals no gallop, no S3, no S4 and no friction rub. No murmur heard.    E of these issues and agrees to the plan. The patient is asked to return for CPX in 1 years.     Assessment:  Problem List Items Addressed This Visit        Cardiovascular    Essential hypertension    Overview     losartan 50         Current Assessment & Sreedhar for recheck.     Nadja Sotelo MD, 4/29/2019, 12:10 PM

## 2019-04-30 NOTE — ASSESSMENT & PLAN NOTE
Stable, Continue present management.     Thyroid  (most recent labs)   Lab Results   Component Value Date/Time    TSH 1.86 03/10/2018 08:36 AM    T4F 1.1 03/23/2019 08:11 AM    TSHT4 10.19 (H) 03/23/2019 08:11 AM         Endocrine Medications          Levot

## 2019-04-30 NOTE — TELEPHONE ENCOUNTER
Patient had mammogram done Saturday and was told further evaluation is needed. Patient would like to know the next step. Please contact patient. No pertinent past medical history <<----- Click to add NO pertinent Past Medical History

## 2019-06-07 ENCOUNTER — APPOINTMENT (OUTPATIENT)
Dept: GENERAL RADIOLOGY | Age: 59
End: 2019-06-07
Attending: FAMILY MEDICINE
Payer: COMMERCIAL

## 2019-06-07 ENCOUNTER — TELEPHONE (OUTPATIENT)
Dept: FAMILY MEDICINE CLINIC | Facility: CLINIC | Age: 59
End: 2019-06-07

## 2019-06-07 ENCOUNTER — HOSPITAL ENCOUNTER (OUTPATIENT)
Age: 59
Discharge: HOME | End: 2019-06-07
Attending: FAMILY MEDICINE
Payer: COMMERCIAL

## 2019-06-07 VITALS
SYSTOLIC BLOOD PRESSURE: 136 MMHG | RESPIRATION RATE: 18 BRPM | TEMPERATURE: 98 F | HEART RATE: 78 BPM | DIASTOLIC BLOOD PRESSURE: 68 MMHG | OXYGEN SATURATION: 99 %

## 2019-06-07 DIAGNOSIS — J45.21 MILD INTERMITTENT ASTHMA WITH EXACERBATION: Primary | ICD-10-CM

## 2019-06-07 PROCEDURE — 99214 OFFICE O/P EST MOD 30 MIN: CPT

## 2019-06-07 PROCEDURE — 71046 X-RAY EXAM CHEST 2 VIEWS: CPT | Performed by: FAMILY MEDICINE

## 2019-06-07 PROCEDURE — 94640 AIRWAY INHALATION TREATMENT: CPT

## 2019-06-07 RX ORDER — PREDNISONE 20 MG/1
TABLET ORAL
Qty: 4 TABLET | Refills: 0 | Status: SHIPPED | OUTPATIENT
Start: 2019-06-07 | End: 2019-09-23 | Stop reason: ALTCHOICE

## 2019-06-07 RX ORDER — IPRATROPIUM BROMIDE AND ALBUTEROL SULFATE 2.5; .5 MG/3ML; MG/3ML
3 SOLUTION RESPIRATORY (INHALATION) ONCE
Status: COMPLETED | OUTPATIENT
Start: 2019-06-07 | End: 2019-06-07

## 2019-06-07 RX ORDER — PREDNISONE 20 MG/1
20 TABLET ORAL ONCE
Status: COMPLETED | OUTPATIENT
Start: 2019-06-07 | End: 2019-06-07

## 2019-06-07 NOTE — TELEPHONE ENCOUNTER
Pt called stating was seen today at Dr. Feliberto Obrien and he listened to her lungs and told he sounded like fluid in lungs and started Pt on Ceftin. While on phone with Pt- Pt seemed very winded and having a hard time finishing sentences.  Pt Hx of asthma but ha

## 2019-06-07 NOTE — TELEPHONE ENCOUNTER
Patient was at the dermatologist and she was having difficulty breathing and they listened to her lungs and she has fluid in them. He prescribed septid and she wants to know if Dr. Herman Madison agrees with it.

## 2019-06-07 NOTE — ED PROVIDER NOTES
Patient Seen in: [facility]    History   Patient presents with:  Cough    Stated Complaint: shortness of breathe, coughing congested     HPI    This 61-year-old female presents to the office with complaint of cough and shortness complaint: shortness of breathe, coughing congested   Other systems are as noted in HPI. Constitutional and vital signs reviewed. All other systems reviewed and negative except as noted above.     Physical Exam     ED Triage Vitals [06/07/19 1419]   B was given Prednisone 20 mg orally and  DuoNeb treatment while in the office. Repeat lung exam shows increase in air exchange. Patient continues to have nonproductive cough.     I reassured the patient that the chest x-ray was normal and shows no evidence you have increased difficulty breathing. Follow-up with your primary doctor in 3 to 5 days if not improving.

## 2019-06-11 ENCOUNTER — TELEPHONE (OUTPATIENT)
Dept: FAMILY MEDICINE CLINIC | Facility: CLINIC | Age: 59
End: 2019-06-11

## 2019-06-11 RX ORDER — PREDNISONE 10 MG/1
TABLET ORAL
Qty: 30 TABLET | Refills: 0 | Status: SHIPPED | OUTPATIENT
Start: 2019-06-11 | End: 2019-09-23 | Stop reason: ALTCHOICE

## 2019-06-11 NOTE — TELEPHONE ENCOUNTER
Pt went to [facility] on Friday when we were unable to see her and she is not better and she would like to talk to the nurse.

## 2019-06-11 NOTE — TELEPHONE ENCOUNTER
Requested Prescriptions     Signed Prescriptions Disp Refills   • predniSONE 10 MG Oral Tab 30 tablet 0     Si.5 tabs BID for 5 days, then 1 BID for 5 days then 1 QD for 5 days then 1/2 tab daily until done     Authorizing Provider: Alejandro Kenny

## 2019-06-11 NOTE — TELEPHONE ENCOUNTER
Pt was seen at [address] [place] [address] on 6/7 for cough. Given Rx for Prednisone 20mg x 4 days. Patient still coughing. She is wondering if she should continue on Prednisone for longer? Additionally, patient states Xyzal is making her swell.  She is asking if she should be c

## 2019-07-10 RX ORDER — PREDNISONE 10 MG/1
TABLET ORAL
Qty: 30 TABLET | Refills: 0 | OUTPATIENT
Start: 2019-07-10

## 2019-07-10 NOTE — TELEPHONE ENCOUNTER
PREDNISONE 10 MG TABLET          Will file in chart as: PREDNISONE 10 MG Oral Tab    Sig: PLEASE SEE ATTACHED FOR DETAILED DIRECTIONS    Disp:  30 tablet    Refills:  0

## 2019-07-10 NOTE — TELEPHONE ENCOUNTER
OV schedule 10/28/19 w/Dr Jacob Corbin  LOV 4/29/19 for physical   Prednisone was give on 6/11/19 SICK Call, no mention of continuation once this therapy is completed.    RX denied at this time

## 2019-08-23 ENCOUNTER — HOSPITAL ENCOUNTER (OUTPATIENT)
Dept: MAMMOGRAPHY | Facility: HOSPITAL | Age: 59
Discharge: HOME | End: 2019-08-23
Attending: FAMILY MEDICINE
Payer: COMMERCIAL

## 2019-08-23 DIAGNOSIS — R92.8 ABNORMAL MAMMOGRAM OF LEFT BREAST: ICD-10-CM

## 2019-08-23 PROCEDURE — 77061 BREAST TOMOSYNTHESIS UNI: CPT | Performed by: FAMILY MEDICINE

## 2019-08-23 PROCEDURE — 76642 ULTRASOUND BREAST LIMITED: CPT | Performed by: FAMILY MEDICINE

## 2019-08-23 PROCEDURE — 77065 DX MAMMO INCL CAD UNI: CPT | Performed by: FAMILY MEDICINE

## 2019-09-05 DIAGNOSIS — E55.9 VITAMIN D DEFICIENCY: ICD-10-CM

## 2019-09-11 RX ORDER — ERGOCALCIFEROL 1.25 MG/1
CAPSULE ORAL
Qty: 4 CAPSULE | Refills: 4 | Status: SHIPPED | OUTPATIENT
Start: 2019-09-11 | End: 2020-01-13

## 2019-09-11 NOTE — TELEPHONE ENCOUNTER
LOV 4/29/2019     Patient was asked to follow-up in: 6 months    Appointment scheduled: 10/28/2019 Mario Green MD     Refill request for:    Requested Prescriptions     Pending Prescriptions Disp Refills   • ERGOCALCIFEROL 18745 units Oral Cap [facility]

## 2019-09-22 DIAGNOSIS — F33.41 RECURRENT MAJOR DEPRESSIVE DISORDER, IN PARTIAL REMISSION (HCC): ICD-10-CM

## 2019-09-23 ENCOUNTER — OFFICE VISIT (OUTPATIENT)
Dept: FAMILY MEDICINE CLINIC | Facility: CLINIC | Age: 59
End: 2019-09-23
Payer: COMMERCIAL

## 2019-09-23 VITALS
HEIGHT: 65 IN | WEIGHT: 189 LBS | RESPIRATION RATE: 14 BRPM | OXYGEN SATURATION: 98 % | HEART RATE: 76 BPM | BODY MASS INDEX: 31.49 KG/M2 | TEMPERATURE: 98 F | DIASTOLIC BLOOD PRESSURE: 74 MMHG | SYSTOLIC BLOOD PRESSURE: 130 MMHG

## 2019-09-23 DIAGNOSIS — J45.31 MILD PERSISTENT ASTHMA WITH (ACUTE) EXACERBATION: Primary | ICD-10-CM

## 2019-09-23 PROCEDURE — 99213 OFFICE O/P EST LOW 20 MIN: CPT | Performed by: PHYSICIAN ASSISTANT

## 2019-09-23 RX ORDER — PREDNISONE 20 MG/1
20 TABLET ORAL DAILY
Qty: 5 TABLET | Refills: 0 | Status: SHIPPED | OUTPATIENT
Start: 2019-09-23 | End: 2019-09-28

## 2019-09-23 RX ORDER — LEVOTHYROXINE SODIUM 0.12 MG/1
125 TABLET ORAL
Qty: 90 TABLET | Refills: 1 | Status: SHIPPED | OUTPATIENT
Start: 2019-09-23 | End: 2020-03-21

## 2019-09-23 RX ORDER — CLOTRIMAZOLE AND BETAMETHASONE DIPROPIONATE 10; .64 MG/G; MG/G
1 CREAM TOPICAL 2 TIMES DAILY PRN
Qty: 15 G | Refills: 3 | Status: SHIPPED | OUTPATIENT
Start: 2019-09-23 | End: 2019-10-28 | Stop reason: ALTCHOICE

## 2019-09-23 RX ORDER — ALBUTEROL SULFATE 90 UG/1
AEROSOL, METERED RESPIRATORY (INHALATION) EVERY 6 HOURS PRN
COMMUNITY

## 2019-09-23 NOTE — TELEPHONE ENCOUNTER
Requested Prescriptions     Pending Prescriptions Disp Refills   • LEVOTHYROXINE SODIUM 125 MCG Oral Tab [Pharmacy Med Name: LEVOTHYROXINE 125 MCG TABLET] 90 tablet 1     Sig: TAKE 1 TABLET (125 MCG TOTAL) BY MOUTH ONCE DAILY.      LOV 4/29/2019     Patient

## 2019-09-23 NOTE — PROGRESS NOTES
Patient presents with:  Cough: x 1 week  Rash: on chest x 2 days        HISTORY OF PRESENT ILLNESS  Vish Domingo is a 64year old female who presents for evaluation of cough.  For the last week, she has been having worsening cough particularly at ni Nystatin-Triamcinolone; Penicillins; Pristiq [Desvenlafaxine]; Sulfa Antibiotics; Tetracyclines & Related; Mold; Dyazide [Hydrochlorothiazide W/Triamterene]; Lamotrigine; Metformin;  Wellbutrin [Bupropion Hcl]    Patient Active Problem List:     Major depre four days. Recommend mucinex. Follow up if no improvement or any worsening symptoms, shortness of breath. Patient expresses understanding and agreement with above plan.   Markel Millan PA-C

## 2019-10-28 ENCOUNTER — OFFICE VISIT (OUTPATIENT)
Dept: FAMILY MEDICINE CLINIC | Facility: CLINIC | Age: 59
End: 2019-10-28
Payer: COMMERCIAL

## 2019-10-28 VITALS
TEMPERATURE: 98 F | RESPIRATION RATE: 12 BRPM | DIASTOLIC BLOOD PRESSURE: 82 MMHG | HEART RATE: 68 BPM | WEIGHT: 189 LBS | SYSTOLIC BLOOD PRESSURE: 130 MMHG | HEIGHT: 65.5 IN | BODY MASS INDEX: 31.11 KG/M2

## 2019-10-28 DIAGNOSIS — R05.3 CHRONIC COUGH: Primary | ICD-10-CM

## 2019-10-28 DIAGNOSIS — I10 ESSENTIAL HYPERTENSION: ICD-10-CM

## 2019-10-28 PROCEDURE — 90471 IMMUNIZATION ADMIN: CPT | Performed by: FAMILY MEDICINE

## 2019-10-28 PROCEDURE — 90686 IIV4 VACC NO PRSV 0.5 ML IM: CPT | Performed by: FAMILY MEDICINE

## 2019-10-28 PROCEDURE — 99213 OFFICE O/P EST LOW 20 MIN: CPT | Performed by: FAMILY MEDICINE

## 2019-10-28 RX ORDER — LOSARTAN POTASSIUM 50 MG/1
50 TABLET ORAL
Qty: 90 TABLET | Refills: 3 | Status: SHIPPED | OUTPATIENT
Start: 2019-10-28 | End: 2021-01-18

## 2019-10-28 NOTE — PROGRESS NOTES
Patient presents with:  Cough      Subjective   HPI:   This is a 64year old female coming in for rash, using rx. Cough continues. Worse on right. Rayleen Alejandrina helped better than other inhalers.    HPI   See reviewed tab for PMSFHx  REVIEW OF SYSTEMS:   GENERA Problem List Items Addressed This Visit        Cardiovascular    Essential hypertension    Overview     losartan 50         Current Assessment & Plan     Stable, Continue present management.     Blood Pressure and Cardiac Medications          losartan P

## 2020-01-13 DIAGNOSIS — E55.9 VITAMIN D DEFICIENCY: ICD-10-CM

## 2020-01-13 RX ORDER — ERGOCALCIFEROL 1.25 MG/1
CAPSULE ORAL
Qty: 12 CAPSULE | Refills: 4 | Status: SHIPPED | OUTPATIENT
Start: 2020-01-13 | End: 2021-01-15

## 2020-01-14 NOTE — TELEPHONE ENCOUNTER
Refill request for:    Requested Prescriptions     Pending Prescriptions Disp Refills   • ERGOCALCIFEROL 1.25 MG (53661 UT) Oral Cap [Pharmacy Med Name: VITAMIN D2 1.25MG(50,000 UNIT)] 12 capsule 1     Sig: PLEASE SEE ATTACHED FOR DETAILED DIRECTIONS

## 2020-01-27 ENCOUNTER — OFFICE VISIT (OUTPATIENT)
Dept: FAMILY MEDICINE CLINIC | Facility: CLINIC | Age: 60
End: 2020-01-27
Payer: COMMERCIAL

## 2020-01-27 VITALS
BODY MASS INDEX: 29.48 KG/M2 | HEART RATE: 76 BPM | WEIGHT: 181.25 LBS | SYSTOLIC BLOOD PRESSURE: 120 MMHG | HEIGHT: 65.6 IN | RESPIRATION RATE: 16 BRPM | DIASTOLIC BLOOD PRESSURE: 80 MMHG | TEMPERATURE: 98 F

## 2020-01-27 DIAGNOSIS — R22.2 MASS OF SUBCUTANEOUS TISSUE OF BACK: ICD-10-CM

## 2020-01-27 DIAGNOSIS — R21 RASH: ICD-10-CM

## 2020-01-27 DIAGNOSIS — M54.50 ACUTE LEFT-SIDED LOW BACK PAIN WITHOUT SCIATICA: ICD-10-CM

## 2020-01-27 DIAGNOSIS — M25.561 ACUTE PAIN OF RIGHT KNEE: Primary | ICD-10-CM

## 2020-01-27 PROCEDURE — 99214 OFFICE O/P EST MOD 30 MIN: CPT | Performed by: NURSE PRACTITIONER

## 2020-01-27 RX ORDER — NAPROXEN 500 MG/1
500 TABLET ORAL 2 TIMES DAILY WITH MEALS
Qty: 14 TABLET | Refills: 0 | Status: SHIPPED | OUTPATIENT
Start: 2020-01-27 | End: 2020-03-09

## 2020-01-27 RX ORDER — INHALER, ASSIST DEVICES
SPACER (EA) MISCELLANEOUS
COMMUNITY
Start: 2019-11-16

## 2020-01-27 NOTE — PROGRESS NOTES
Patient presents with:  Knee Pain: x 5- 6 weeks, right knee and left knee pain when going down the stairs   Back Pain: lower back, lump      HPI:  Presents with approx 5-6 week history of intermittent right knee pain mostly occurring hen going down stairs Vitamin D deficiency     Dx in 2012       Patient Active Problem List:     Major depression in partial remission ([facility] [address]     Rosacea     Hypothyroidism (acquired)     Mild persistent asthma     Primary localized osteoarthrosis, hand     Prediabetes     TMJ d rhonchi or rales  MS: Low back is non-TTP, without or obvious deformity (mass as below). Unable to perform SLR/internal/external rotation as patient is wearing skirt to office today. Patellar DTR 2+ bilaterally.    Ext: right knee w/o edema, erythema, sabrina this Visit:  Requested Prescriptions     Signed Prescriptions Disp Refills   • naproxen 500 MG Oral Tab 14 tablet 0     Sig: Take 1 tablet (500 mg total) by mouth 2 (two) times daily with meals.        Imaging & Consults:  OP REFERRAL TO [facility]

## 2020-02-01 ENCOUNTER — HOSPITAL ENCOUNTER (OUTPATIENT)
Dept: ULTRASOUND IMAGING | Facility: HOSPITAL | Age: 60
Discharge: HOME | End: 2020-02-01
Attending: NURSE PRACTITIONER
Payer: COMMERCIAL

## 2020-02-01 DIAGNOSIS — R22.2 MASS OF SUBCUTANEOUS TISSUE OF BACK: ICD-10-CM

## 2020-02-01 PROCEDURE — 76705 ECHO EXAM OF ABDOMEN: CPT | Performed by: NURSE PRACTITIONER

## 2020-02-11 ENCOUNTER — OFFICE VISIT (OUTPATIENT)
Dept: FAMILY MEDICINE CLINIC | Facility: CLINIC | Age: 60
End: 2020-02-11
Payer: COMMERCIAL

## 2020-02-11 VITALS
TEMPERATURE: 99 F | WEIGHT: 180.5 LBS | BODY MASS INDEX: 29 KG/M2 | RESPIRATION RATE: 18 BRPM | DIASTOLIC BLOOD PRESSURE: 80 MMHG | HEART RATE: 78 BPM | SYSTOLIC BLOOD PRESSURE: 120 MMHG | OXYGEN SATURATION: 97 %

## 2020-02-11 DIAGNOSIS — J01.00 ACUTE NON-RECURRENT MAXILLARY SINUSITIS: Primary | ICD-10-CM

## 2020-02-11 DIAGNOSIS — R22.2 MASS OF SUBCUTANEOUS TISSUE OF BACK: ICD-10-CM

## 2020-02-11 PROCEDURE — 99214 OFFICE O/P EST MOD 30 MIN: CPT | Performed by: NURSE PRACTITIONER

## 2020-02-11 RX ORDER — AZITHROMYCIN 250 MG/1
TABLET, FILM COATED ORAL
Qty: 6 TABLET | Refills: 0 | Status: SHIPPED | OUTPATIENT
Start: 2020-02-11 | End: 2020-03-09 | Stop reason: ALTCHOICE

## 2020-02-11 NOTE — PATIENT INSTRUCTIONS
Gargle with warm salt water solution 3-5 times daily. Dissolve 1/2 teaspoon salt in half cup of warm tap water. Gargle and spit.      Try a premixed saline nasal spray, available over the counter, such as [place] Nasal Spray (or generic equi

## 2020-02-11 NOTE — PROGRESS NOTES
Patient presents with:  Sinus Problem: x 4 days, sinus dranage, chest congestion, sharp chest pain that lasted for a few seconds       HPI:  Presents with 4 day history of left cheek pain, chest congestion, cough without production of sputum, sinus congest then one daily. 6 tablet 0   • Spacer/Aero-Holding Chambers (OPTICHAMBER KARTHIKEYAN) Does not apply Misc TO USE WITH MDI INHALERS     • naproxen 500 MG Oral Tab Take 1 tablet (500 mg total) by mouth 2 (two) times daily with meals.  14 tablet 0   • ERGOCALCIFER touch.    A/P:    Acute non-recurrent maxillary sinusitis  (primary encounter diagnosis)- Azithromycin.  Do routine nasal saline sinus rinses, warm salt water gargles, as per patient instructions, along with supportive care-increased fluids/rest. Instructed understands the plan.

## 2020-03-09 ENCOUNTER — OFFICE VISIT (OUTPATIENT)
Dept: FAMILY MEDICINE CLINIC | Facility: CLINIC | Age: 60
End: 2020-03-09
Payer: COMMERCIAL

## 2020-03-09 VITALS
DIASTOLIC BLOOD PRESSURE: 64 MMHG | SYSTOLIC BLOOD PRESSURE: 122 MMHG | HEIGHT: 65.5 IN | RESPIRATION RATE: 16 BRPM | WEIGHT: 179 LBS | HEART RATE: 76 BPM | BODY MASS INDEX: 29.46 KG/M2

## 2020-03-09 DIAGNOSIS — M46.1 SACROILIAC INFLAMMATION (HCC): ICD-10-CM

## 2020-03-09 DIAGNOSIS — J01.00 ACUTE NON-RECURRENT MAXILLARY SINUSITIS: Primary | ICD-10-CM

## 2020-03-09 PROCEDURE — 99213 OFFICE O/P EST LOW 20 MIN: CPT | Performed by: FAMILY MEDICINE

## 2020-03-09 RX ORDER — NAPROXEN 500 MG/1
500 TABLET ORAL 2 TIMES DAILY WITH MEALS
Qty: 60 TABLET | Refills: 0 | Status: SHIPPED | OUTPATIENT
Start: 2020-03-09 | End: 2020-11-09

## 2020-03-09 NOTE — PROGRESS NOTES
Patient presents with:  Low Back Pain  Foot Pain: right foot       Subjective   HPI:   This is a 64year old female coming in for LBP and knee and foot pain. Sudden onset, saw zulma on 2/11. Went to PT.    Knee better, back worse, trouble with lateral ankle normal back ROM. Extension: normal   Flexion: normal   Lateral bend right: normal   Lateral bend left: normal   Rotation right: normal   Rotation left: normal     Muscle Strength   The patient has normal back strength.   Right Quadriceps:  5/5   Left [place]

## 2020-03-21 DIAGNOSIS — F33.41 RECURRENT MAJOR DEPRESSIVE DISORDER, IN PARTIAL REMISSION (HCC): ICD-10-CM

## 2020-03-21 RX ORDER — LEVOTHYROXINE SODIUM 0.12 MG/1
125 TABLET ORAL
Qty: 90 TABLET | Refills: 0 | Status: SHIPPED | OUTPATIENT
Start: 2020-03-21 | End: 2020-06-26

## 2020-04-29 ENCOUNTER — VIRTUAL PHONE E/M (OUTPATIENT)
Dept: FAMILY MEDICINE CLINIC | Facility: CLINIC | Age: 60
End: 2020-04-29
Payer: COMMERCIAL

## 2020-04-29 DIAGNOSIS — J45.31 MILD PERSISTENT ASTHMA WITH ACUTE EXACERBATION: ICD-10-CM

## 2020-04-29 DIAGNOSIS — M25.561 ACUTE PAIN OF RIGHT KNEE: Primary | ICD-10-CM

## 2020-04-29 PROCEDURE — 99213 OFFICE O/P EST LOW 20 MIN: CPT | Performed by: FAMILY MEDICINE

## 2020-04-29 NOTE — PROGRESS NOTES
Virtual/Telephone Check-In    Dejan Newton verbally consents to a Virtual/Telephone Check-In service on 04/29/20.   Patient understands and accepts financial responsibility for any deductible, co-insurance and/or co-pays associated with this service

## 2020-05-06 ENCOUNTER — TELEPHONE (OUTPATIENT)
Dept: FAMILY MEDICINE CLINIC | Facility: CLINIC | Age: 60
End: 2020-05-06

## 2020-05-06 NOTE — TELEPHONE ENCOUNTER
Virtual/Telephone Check-In    Binh Newton verbally consents to a Virtual/Telephone Check-In service on 5/6/2020.   Patient understands and accepts financial responsibility for any deductible, co-insurance and/or co-pays associated with this service

## 2020-05-06 NOTE — TELEPHONE ENCOUNTER
Jean-Claude, no improvement, naproxen helps for a few hours, will see her Face to Face Friday morning.    Future Appointments   Date Time Provider Letty Avendaño   5/8/2020  9:30 AM Rosales Edward MD [facility]   6/12/2020  1:30 PM Dottie Ballard MD

## 2020-05-08 ENCOUNTER — OFFICE VISIT (OUTPATIENT)
Dept: FAMILY MEDICINE CLINIC | Facility: CLINIC | Age: 60
End: 2020-05-08
Payer: COMMERCIAL

## 2020-05-08 VITALS
TEMPERATURE: 98 F | WEIGHT: 179 LBS | DIASTOLIC BLOOD PRESSURE: 80 MMHG | SYSTOLIC BLOOD PRESSURE: 130 MMHG | HEART RATE: 84 BPM | BODY MASS INDEX: 29 KG/M2

## 2020-05-08 DIAGNOSIS — M22.41 CHONDROMALACIA OF RIGHT PATELLA: Primary | ICD-10-CM

## 2020-05-08 PROCEDURE — 99213 OFFICE O/P EST LOW 20 MIN: CPT | Performed by: FAMILY MEDICINE

## 2020-05-08 NOTE — PROGRESS NOTES
Face to Face vsit     Patient presents with:  Knee Pain: x 2 months, despite physical therapy pain in right knee is worse, pain when walking/up and downt stairs      Subjective   HPI:   This is a 64year old female coming in for knee pain getting worse ove °C) (Oral)   Wt 179 lb (81.2 kg)   LMP 07/27/2015 (Exact Date)   BMI 29.33 kg/m²  Body mass index is 29.33 kg/m². Physical Exam   Nursing note and vitals reviewed. Constitutional: She is oriented to person, place, and time.  She appears well-developed a negative  Drawer:  Anterior - negative     Posterior - negative  Pivot shift: negative    Other   Erythema: absent  Sensation: normal  Pulse: present  Swelling: none  Effusion: no effusion present                 Assessment and Plan:     Problem List Items

## 2020-05-08 NOTE — PATIENT INSTRUCTIONS
Common Kneecap (Patella) Problems  If the kneecap is “off track” even slightly (a tracking problem), it can cause uneven pressure on the back of the kneecap. This can cause pain and difficulty with movements, such as walking and going down stairs.  Below

## 2020-06-26 DIAGNOSIS — F33.41 RECURRENT MAJOR DEPRESSIVE DISORDER, IN PARTIAL REMISSION (HCC): ICD-10-CM

## 2020-06-26 RX ORDER — LEVOTHYROXINE SODIUM 0.12 MG/1
125 TABLET ORAL
Qty: 90 TABLET | Refills: 0 | Status: SHIPPED | OUTPATIENT
Start: 2020-06-26 | End: 2020-06-30

## 2020-06-26 NOTE — TELEPHONE ENCOUNTER
Pt did contact her pharmacy to request a refill for LEVOTHYROXINE SODIUM 125 MCG Oral.  However, pt wanted to let us know she only has 3 pills left and really needs a refill on this medication

## 2020-06-26 NOTE — TELEPHONE ENCOUNTER
Requested Prescriptions     Pending Prescriptions Disp Refills   • LEVOTHYROXINE SODIUM 125 MCG Oral Tab [Pharmacy Med Name: LEVOTHYROXINE 125 MCG TABLET] 90 tablet 0     Sig: TAKE 1 TABLET (125 MCG TOTAL) BY MOUTH ONCE DAILY.      LOV 5/8/2020     Patient

## 2020-07-23 ENCOUNTER — TELEPHONE (OUTPATIENT)
Dept: FAMILY MEDICINE CLINIC | Facility: CLINIC | Age: 60
End: 2020-07-23

## 2020-07-23 DIAGNOSIS — E03.9 HYPOTHYROIDISM (ACQUIRED): Primary | ICD-10-CM

## 2020-07-23 DIAGNOSIS — F33.41 RECURRENT MAJOR DEPRESSIVE DISORDER, IN PARTIAL REMISSION (HCC): ICD-10-CM

## 2020-07-23 DIAGNOSIS — R73.03 PREDIABETES: ICD-10-CM

## 2020-07-23 DIAGNOSIS — Z12.31 VISIT FOR SCREENING MAMMOGRAM: ICD-10-CM

## 2020-07-23 DIAGNOSIS — R73.9 HYPERGLYCEMIA: ICD-10-CM

## 2020-07-23 DIAGNOSIS — Z00.00 LABORATORY EXAMINATION ORDERED AS PART OF A ROUTINE GENERAL MEDICAL EXAMINATION: ICD-10-CM

## 2020-07-23 NOTE — TELEPHONE ENCOUNTER
Please enter lab orders for the patient's upcoming physical appointment. Physical scheduled:    Your appointments     Date & Time Appointment Department [facility])    Sep 18, 2020  1:30 PM CDT Physical - Established with Edna Flores  [facility]

## 2020-07-23 NOTE — TELEPHONE ENCOUNTER
Patient scheduled her annual physical on 9/18/20 with Dr. Marta Rocha, she will run out of Levothyroxine Sodium 125 MCG Oral Tab medication before appointment. Patient would like to know if Dr. Marta Rocha can issue temporary med refill till her scheduled appointment.

## 2020-07-24 RX ORDER — LEVOTHYROXINE SODIUM 0.12 MG/1
125 TABLET ORAL
Qty: 90 TABLET | Refills: 0 | Status: SHIPPED | OUTPATIENT
Start: 2020-07-24 | End: 2020-10-07

## 2020-09-18 ENCOUNTER — OFFICE VISIT (OUTPATIENT)
Dept: FAMILY MEDICINE CLINIC | Facility: CLINIC | Age: 60
End: 2020-09-18
Payer: COMMERCIAL

## 2020-09-18 VITALS
WEIGHT: 176 LBS | TEMPERATURE: 98 F | HEART RATE: 68 BPM | RESPIRATION RATE: 18 BRPM | HEIGHT: 65.5 IN | DIASTOLIC BLOOD PRESSURE: 66 MMHG | SYSTOLIC BLOOD PRESSURE: 130 MMHG | BODY MASS INDEX: 28.97 KG/M2

## 2020-09-18 DIAGNOSIS — R73.03 PREDIABETES: ICD-10-CM

## 2020-09-18 DIAGNOSIS — Z00.00 ANNUAL PHYSICAL EXAM: Primary | ICD-10-CM

## 2020-09-18 DIAGNOSIS — J45.31 MILD PERSISTENT ASTHMA WITH ACUTE EXACERBATION: ICD-10-CM

## 2020-09-18 DIAGNOSIS — E03.9 HYPOTHYROIDISM (ACQUIRED): ICD-10-CM

## 2020-09-18 DIAGNOSIS — F33.41 RECURRENT MAJOR DEPRESSIVE DISORDER, IN PARTIAL REMISSION (HCC): ICD-10-CM

## 2020-09-18 DIAGNOSIS — I10 ESSENTIAL HYPERTENSION: ICD-10-CM

## 2020-09-18 PROCEDURE — 3008F BODY MASS INDEX DOCD: CPT | Performed by: FAMILY MEDICINE

## 2020-09-18 PROCEDURE — 90750 HZV VACC RECOMBINANT IM: CPT | Performed by: FAMILY MEDICINE

## 2020-09-18 PROCEDURE — 99396 PREV VISIT EST AGE 40-64: CPT | Performed by: FAMILY MEDICINE

## 2020-09-18 PROCEDURE — 90472 IMMUNIZATION ADMIN EACH ADD: CPT | Performed by: FAMILY MEDICINE

## 2020-09-18 PROCEDURE — 3075F SYST BP GE 130 - 139MM HG: CPT | Performed by: FAMILY MEDICINE

## 2020-09-18 PROCEDURE — 90471 IMMUNIZATION ADMIN: CPT | Performed by: FAMILY MEDICINE

## 2020-09-18 PROCEDURE — 3078F DIAST BP <80 MM HG: CPT | Performed by: FAMILY MEDICINE

## 2020-09-18 PROCEDURE — 90686 IIV4 VACC NO PRSV 0.5 ML IM: CPT | Performed by: FAMILY MEDICINE

## 2020-09-18 NOTE — PROGRESS NOTES
Matthew Grewal is a 64year old female who presents for a complete physical exam.   HPI:   Patient presents with:  Physical: WWE no pap  Imm/Inj: Flu       Her last annual assessment has been over 1 year: Annual Physical due on 04/29/2020       Pt co CHOLEST 207 (H) 03/23/2019 08:11 AM    HDL 48 (L) 03/23/2019 08:11 AM    TRIG 220 (H) 03/23/2019 08:11 AM     (H) 03/23/2019 08:11 AM    NONHDLC 159 (H) 03/23/2019 08:11 AM       Lab Results   Component Value Date/Time    A1C 4.6 04/26/2018    VITD reports current alcohol use. She reports that she does not use drugs. Exercise: three times per week. Diet: watches minimally and watches fats closely    REVIEW OF SYSTEMS:   A comprehensive 14 point review of systems was completed.   Pertinent positives No murmur heard. Edema not present. Carotid bruit not present. Pulmonary/Chest: Effort normal and breath sounds normal. No respiratory distress. She has no decreased breath sounds. She has no wheezes. She has no rales. She exhibits no tenderness.    Abd 04/29/2021  Asthma Control Test due on 04/29/2021  Annual Depression Screen due on 05/08/2021  Pap Smear,5 Years due on 09/07/2021    Pt info given for: exercise, low fat diet, The patient indicates understanding of these issues and agrees to the plan.   aKyli Johnson Other Visit Diagnoses     Annual physical exam    -  Primary         Return in about 6 months (around 3/18/2021) for recheck.     Jere Hoff MD, 9/18/2020, 1:44 PM

## 2020-09-18 NOTE — ASSESSMENT & PLAN NOTE
Stable, Continue present management.     Thyroid  (most recent labs)   Lab Results   Component Value Date/Time    TSH 1.46 05/18/2019 10:35 AM    T4F 1.5 05/18/2019 10:35 AM    TSHT4 10.19 (H) 03/23/2019 08:11 AM         Endocrine Medications          Levot

## 2020-09-19 LAB
ALBUMIN/GLOBULIN RATIO: 1.6 (CALC) (ref 1–2.5)
ALBUMIN: 4 G/DL (ref 3.6–5.1)
ALKALINE PHOSPHATASE: 92 U/L (ref 37–153)
ALT: 20 U/L (ref 6–29)
AST: 18 U/L (ref 10–35)
BILIRUBIN, TOTAL: 0.5 MG/DL (ref 0.2–1.2)
BUN: 13 MG/DL (ref 7–25)
CALCIUM: 9.2 MG/DL (ref 8.6–10.4)
CARBON DIOXIDE: 29 MMOL/L (ref 20–32)
CHLORIDE: 104 MMOL/L (ref 98–110)
CREATININE: 0.77 MG/DL (ref 0.5–1.05)
EGFR IF AFRICN AM: 99 ML/MIN/1.73M2
EGFR IF NONAFRICN AM: 85 ML/MIN/1.73M2
GLOBULIN: 2.5 G/DL (CALC) (ref 1.9–3.7)
GLUCOSE: 113 MG/DL (ref 65–139)
HEMATOCRIT: 40.8 % (ref 35–45)
HEMOGLOBIN A1C: 5.7 % OF TOTAL HGB
HEMOGLOBIN: 13.7 G/DL (ref 11.7–15.5)
MCH: 28.4 PG (ref 27–33)
MCHC: 33.6 G/DL (ref 32–36)
MCV: 84.5 FL (ref 80–100)
MPV: 11.1 FL (ref 7.5–12.5)
PLATELET COUNT: 248 THOUSAND/UL (ref 140–400)
POTASSIUM: 4.1 MMOL/L (ref 3.5–5.3)
PROTEIN, TOTAL: 6.5 G/DL (ref 6.1–8.1)
RDW: 13.3 % (ref 11–15)
RED BLOOD CELL COUNT: 4.83 MILLION/UL (ref 3.8–5.1)
SODIUM: 139 MMOL/L (ref 135–146)
T4, FREE: 1.4 NG/DL (ref 0.8–1.8)
TSH: 1.4 MIU/L (ref 0.4–4.5)
WHITE BLOOD CELL COUNT: 5.9 THOUSAND/UL (ref 3.8–10.8)

## 2020-09-27 LAB
CHOL/HDLC RATIO: 3.4 (CALC)
CHOLESTEROL, TOTAL: 197 MG/DL
HDL CHOLESTEROL: 58 MG/DL
LDL-CHOLESTEROL: 117 MG/DL (CALC)
NON-HDL CHOLESTEROL: 139 MG/DL (CALC)
TRIGLYCERIDES: 116 MG/DL

## 2020-10-07 ENCOUNTER — TELEPHONE (OUTPATIENT)
Dept: FAMILY MEDICINE CLINIC | Facility: CLINIC | Age: 60
End: 2020-10-07

## 2020-10-07 DIAGNOSIS — F33.41 RECURRENT MAJOR DEPRESSIVE DISORDER, IN PARTIAL REMISSION (HCC): ICD-10-CM

## 2020-10-07 RX ORDER — LEVOTHYROXINE SODIUM 0.12 MG/1
TABLET ORAL
Qty: 90 TABLET | Refills: 0 | Status: SHIPPED | OUTPATIENT
Start: 2020-10-07 | End: 2020-12-22

## 2020-10-07 NOTE — TELEPHONE ENCOUNTER
Pt is calling regarding blood work results. Pt can not access her [employer] to review results. Please contact patient.

## 2020-10-07 NOTE — TELEPHONE ENCOUNTER
Called and talked to patient and she has been feeling cold and tired and feels it might be the change in thyroid medication (manufacturor) so she is getting this changed and will see if this changes her thyroid labs were normal. She will call back if not i

## 2020-10-07 NOTE — TELEPHONE ENCOUNTER
Requested Prescriptions     Pending Prescriptions Disp Refills   • LEVOTHYROXINE SODIUM 125 MCG Oral Tab [Pharmacy Med Name: LEVOTHYROXINE 0.125MG (125MCG) TAB] 90 tablet 0     Sig: TAKE 1 TABLET BY MOUTH DAILY     LOV 9/18/2020         Appointment arturo

## 2020-10-31 ENCOUNTER — HOSPITAL ENCOUNTER (OUTPATIENT)
Dept: MAMMOGRAPHY | Facility: HOSPITAL | Age: 60
Discharge: HOME | End: 2020-10-31
Attending: FAMILY MEDICINE
Payer: COMMERCIAL

## 2020-10-31 DIAGNOSIS — Z12.31 VISIT FOR SCREENING MAMMOGRAM: ICD-10-CM

## 2020-10-31 PROCEDURE — 77067 SCR MAMMO BI INCL CAD: CPT | Performed by: FAMILY MEDICINE

## 2020-10-31 PROCEDURE — 77063 BREAST TOMOSYNTHESIS BI: CPT | Performed by: FAMILY MEDICINE

## 2020-11-08 ENCOUNTER — HOSPITAL ENCOUNTER (OUTPATIENT)
Age: 60
Discharge: HOME | End: 2020-11-08
Payer: COMMERCIAL

## 2020-11-08 ENCOUNTER — APPOINTMENT (OUTPATIENT)
Dept: GENERAL RADIOLOGY | Age: 60
End: 2020-11-08
Attending: PHYSICIAN ASSISTANT
Payer: COMMERCIAL

## 2020-11-08 VITALS
DIASTOLIC BLOOD PRESSURE: 71 MMHG | OXYGEN SATURATION: 100 % | RESPIRATION RATE: 16 BRPM | SYSTOLIC BLOOD PRESSURE: 130 MMHG | HEART RATE: 70 BPM | TEMPERATURE: 98 F

## 2020-11-08 DIAGNOSIS — M25.561 ACUTE PAIN OF RIGHT KNEE: Primary | ICD-10-CM

## 2020-11-08 PROCEDURE — 73560 X-RAY EXAM OF KNEE 1 OR 2: CPT | Performed by: PHYSICIAN ASSISTANT

## 2020-11-08 PROCEDURE — 99213 OFFICE O/P EST LOW 20 MIN: CPT

## 2020-11-08 NOTE — ED PROVIDER NOTES
Patient Seen in: [facility]      History   Patient presents with:  Knee Pain    Stated Complaint: right knee pain    HPI    56-year-old female presenting for evaluation of right knee pain.   Patient states she was walking up the steps this mor 98 °F (36.7 °C) (Temporal)   Resp 16   LMP 07/27/2015 (Exact Date)   SpO2 100%         Physical Exam  Vitals signs and nursing note reviewed. Constitutional:       General: She is not in acute distress. HENT:      Head: Normocephalic and atraumatic. [address] [facility]          Farhad Santacruz MD  [address] [place] [address]  [address]  [facility] [id number]  [phone]                Medications Prescribed:  Current Discharge Medication List

## 2020-11-08 NOTE — ED INITIAL ASSESSMENT (HPI)
PATIENT ARRIVED AMBULATORY TO ROOM C/O RIGHT KNEE PAIN. PATIENT STATES SHE WAS WALKING UP THE STAIRS TODAY AND FELT A \"POP\" PAIN WORSENS WITH MOVEMENT. NO NUMBNESS/TINGLING TO FOOT.

## 2020-11-09 ENCOUNTER — TELEPHONE (OUTPATIENT)
Dept: FAMILY MEDICINE CLINIC | Facility: CLINIC | Age: 60
End: 2020-11-09

## 2020-11-09 RX ORDER — NAPROXEN 500 MG/1
500 TABLET ORAL 2 TIMES DAILY PRN
Qty: 180 TABLET | Refills: 1 | Status: SHIPPED | OUTPATIENT
Start: 2020-11-09

## 2020-11-09 NOTE — TELEPHONE ENCOUNTER
Please notify:    Requested Prescriptions     Signed Prescriptions Disp Refills   • naproxen 500 MG Oral Tab 180 tablet 1     Sig: Take 1 tablet (500 mg total) by mouth 2 (two) times daily as needed (Knee pain).      Authorizing Provider: America Lee

## 2020-11-09 NOTE — TELEPHONE ENCOUNTER
Mammogram results reviewed with patient. Patient reports she was seen in [address] [place] [address] yesterday for knee pain.  She is requesting a refill of Naproxen that was previously prescribed by Dr Val Rivas as she gets better and longer relief with this than the 600mg ibuprofe

## 2020-11-23 ENCOUNTER — TELEPHONE (OUTPATIENT)
Dept: FAMILY MEDICINE CLINIC | Facility: CLINIC | Age: 60
End: 2020-11-23

## 2020-11-23 NOTE — TELEPHONE ENCOUNTER
Future Appointments   Date Time Provider Letty Avendaño   11/24/2020  4:30 PM Vonnie Chavarria MD THE [facility] THE [facility]   11/27/2020 10:00 AM [facility] 03 NURSE [facility] 3 [facility] Av   12/11/2020  3:30 PM Duc Mayo MD [facility]     Pended estefany f

## 2020-11-24 ENCOUNTER — HOSPITAL ENCOUNTER (OUTPATIENT)
Dept: GENERAL RADIOLOGY | Facility: HOSPITAL | Age: 60
Discharge: HOME | End: 2020-11-24
Attending: ORTHOPAEDIC SURGERY
Payer: COMMERCIAL

## 2020-11-24 ENCOUNTER — OFFICE VISIT (OUTPATIENT)
Dept: ORTHOPEDICS CLINIC | Facility: CLINIC | Age: 60
End: 2020-11-24
Payer: COMMERCIAL

## 2020-11-24 VITALS — HEIGHT: 65.5 IN | WEIGHT: 176 LBS | BODY MASS INDEX: 28.97 KG/M2

## 2020-11-24 DIAGNOSIS — M54.50 LOW BACK PAIN, UNSPECIFIED BACK PAIN LATERALITY, UNSPECIFIED CHRONICITY, UNSPECIFIED WHETHER SCIATICA PRESENT: ICD-10-CM

## 2020-11-24 DIAGNOSIS — S83.241D ACUTE MEDIAL MENISCUS TEAR OF RIGHT KNEE, SUBSEQUENT ENCOUNTER: Primary | ICD-10-CM

## 2020-11-24 DIAGNOSIS — M70.61 GREATER TROCHANTERIC BURSITIS OF RIGHT HIP: ICD-10-CM

## 2020-11-24 PROCEDURE — 99212 OFFICE O/P EST SF 10 MIN: CPT | Performed by: ORTHOPAEDIC SURGERY

## 2020-11-24 PROCEDURE — 99204 OFFICE O/P NEW MOD 45 MIN: CPT | Performed by: ORTHOPAEDIC SURGERY

## 2020-11-24 PROCEDURE — 3008F BODY MASS INDEX DOCD: CPT | Performed by: ORTHOPAEDIC SURGERY

## 2020-11-24 PROCEDURE — 72100 X-RAY EXAM L-S SPINE 2/3 VWS: CPT | Performed by: ORTHOPAEDIC SURGERY

## 2020-11-24 NOTE — H&P
NURSING INTAKE COMMENTS: Patient presents with:  Consult: right knee pain that radiates to lower back -- XR taken of right knee. No XR of l-spine. US taken of lower back. Describes pain as stabbing/sharp. Rates pain 8-9/10 when pain occurs.  Denies any numb vomiting   • Hypothyroidism     Dx in 2015   • Ulcer of esophagus with bleeding 6/29/2012   • Vitamin D deficiency     Dx in 2012     Past Surgical History:   Procedure Laterality Date   • COLONOSCOPY  08/25/2017    Dr. [facility]   • EGD  08/ Problem Relation Age of Onset   • Arthritis Mother    • Cancer Mother         Lung   • Other (Other) Mother    • Cancer Father         lung   • Other (tobbaco use) Father    • Other (Other) Sister         Scleroderma   • Other (Other) Other         Cousi lumbosacral facet for a positive facet load test.  She could toe walk, heel walk, squat fully and come back up. Squatting hurt her knee a little. The knees both had motion from 0 to 130 degrees without instability or lateral tenderness.   Both knees had MG Jorge L, [facility] SCREENING BILAT (CPT=77067), 12/22/2018, 10:46 AM.  [facility] , , JUAN MANUEL JESSENIA 2D+3D DIAGNOSTIC JUAN MANUEL LEFT (CPT=77065/28754), 8/23/2019, 1:06 PM.  INDICATIONS:  Z12.31 Encounter for screening mammogram for malignant neoplasm of breast.  Annual of right hip        Assessment: Left lumbosacral facet arthritis and right knee medial meniscus tear    Plan: She is adverse to cortisone. Therefore I did not order an MRI of the lumbar spine and a pain center evaluation.   The right knee was the more pres

## 2020-11-27 ENCOUNTER — NURSE ONLY (OUTPATIENT)
Dept: FAMILY MEDICINE CLINIC | Facility: CLINIC | Age: 60
End: 2020-11-27
Payer: COMMERCIAL

## 2020-11-27 VITALS — TEMPERATURE: 98 F

## 2020-11-27 DIAGNOSIS — Z23 NEED FOR SHINGLES VACCINE: Primary | ICD-10-CM

## 2020-11-27 PROCEDURE — 90471 IMMUNIZATION ADMIN: CPT | Performed by: FAMILY MEDICINE

## 2020-11-27 PROCEDURE — 90750 HZV VACC RECOMBINANT IM: CPT | Performed by: FAMILY MEDICINE

## 2020-12-08 ENCOUNTER — TELEPHONE (OUTPATIENT)
Dept: ORTHOPEDICS CLINIC | Facility: CLINIC | Age: 60
End: 2020-12-08

## 2020-12-08 NOTE — TELEPHONE ENCOUNTER
Called pt and informed her no PA needed for MRI and gave her phone # to CS to make appt for MRI knee. She verbalized understanding.

## 2020-12-22 DIAGNOSIS — F33.41 RECURRENT MAJOR DEPRESSIVE DISORDER, IN PARTIAL REMISSION (HCC): ICD-10-CM

## 2020-12-22 RX ORDER — LEVOTHYROXINE SODIUM 0.12 MG/1
TABLET ORAL
Qty: 90 TABLET | Refills: 0 | Status: SHIPPED | OUTPATIENT
Start: 2020-12-22 | End: 2021-02-22

## 2020-12-22 NOTE — TELEPHONE ENCOUNTER
Requested Prescriptions     Pending Prescriptions Disp Refills   • LEVOTHYROXINE SODIUM 125 MCG Oral Tab [Pharmacy Med Name: LEVOTHYROXINE 0.125MG (125MCG) TAB] 90 tablet 0     Sig: TAKE 1 TABLET BY MOUTH DAILY     LOV 9/18/2020     Patient was asked to fo

## 2021-01-03 ENCOUNTER — HOSPITAL ENCOUNTER (OUTPATIENT)
Dept: MRI IMAGING | Facility: HOSPITAL | Age: 61
Discharge: HOME | End: 2021-01-03
Attending: ORTHOPAEDIC SURGERY
Payer: COMMERCIAL

## 2021-01-03 DIAGNOSIS — S83.241D ACUTE MEDIAL MENISCUS TEAR OF RIGHT KNEE, SUBSEQUENT ENCOUNTER: ICD-10-CM

## 2021-01-03 PROCEDURE — 73721 MRI JNT OF LWR EXTRE W/O DYE: CPT | Performed by: ORTHOPAEDIC SURGERY

## 2021-01-06 ENCOUNTER — TELEPHONE (OUTPATIENT)
Dept: ORTHOPEDICS CLINIC | Facility: CLINIC | Age: 61
End: 2021-01-06

## 2021-01-06 NOTE — TELEPHONE ENCOUNTER
Spoke to pt and scheduled appt with [facility] for MRI results on 01/12/21 at [address]. Directions discussed and pt verbalized understanding.

## 2021-01-12 ENCOUNTER — OFFICE VISIT (OUTPATIENT)
Dept: ORTHOPEDICS CLINIC | Facility: CLINIC | Age: 61
End: 2021-01-12
Payer: COMMERCIAL

## 2021-01-12 VITALS — HEART RATE: 67 BPM | RESPIRATION RATE: 16 BRPM | SYSTOLIC BLOOD PRESSURE: 125 MMHG | DIASTOLIC BLOOD PRESSURE: 75 MMHG

## 2021-01-12 DIAGNOSIS — M22.41 CHONDROMALACIA, PATELLA, RIGHT: Primary | ICD-10-CM

## 2021-01-12 DIAGNOSIS — M94.261 CHONDROMALACIA OF MEDIAL FEMORAL CONDYLE, RIGHT: ICD-10-CM

## 2021-01-12 PROCEDURE — 20610 DRAIN/INJ JOINT/BURSA W/O US: CPT | Performed by: ORTHOPAEDIC SURGERY

## 2021-01-12 PROCEDURE — 3074F SYST BP LT 130 MM HG: CPT | Performed by: ORTHOPAEDIC SURGERY

## 2021-01-12 PROCEDURE — 99213 OFFICE O/P EST LOW 20 MIN: CPT | Performed by: ORTHOPAEDIC SURGERY

## 2021-01-12 PROCEDURE — 3078F DIAST BP <80 MM HG: CPT | Performed by: ORTHOPAEDIC SURGERY

## 2021-01-12 RX ORDER — TRIAMCINOLONE ACETONIDE 40 MG/ML
40 INJECTION, SUSPENSION INTRA-ARTICULAR; INTRAMUSCULAR ONCE
Status: COMPLETED | OUTPATIENT
Start: 2021-01-12 | End: 2021-01-12

## 2021-01-12 RX ADMIN — TRIAMCINOLONE ACETONIDE 40 MG: 40 INJECTION, SUSPENSION INTRA-ARTICULAR; INTRAMUSCULAR at 17:45:00

## 2021-01-12 NOTE — PROGRESS NOTES
NURSING INTAKE COMMENTS: Patient presents with:  Knee Pain: Right f/u and MRI results - states she still has sharp pains when she goes down the stairs on and off       HPI: This 64year old female presents today to the MRI of her right knee.   The pain was DETAILED DIRECTIONS 12 capsule 4   • losartan Potassium 50 MG Oral Tab Take 1 tablet (50 mg total) by mouth once daily.  90 tablet 3   • Albuterol Sulfate HFA (PROAIR HFA) 108 (90 Base) MCG/ACT Inhalation Aero Soln Inhale into the lungs every 6 (six) hours or double vision  HEENT: denies new nasal congestion, sinus pain or ST  LUNGS: denies shortness of breath  CARDIOVASCULAR: denies chest pain  GI: no hematemesis, no worsening heartburn, no diarrhea  : no dysuria, no blood in urine, no difficulty urinatin effusion. There is a small Baker's cyst.  The menisci, cruciate, and medial collateral ligaments, lateral collateral ligamentous complex, and extensor mechanism are intact. There is no muscle atrophy or edema.  The semimembranosus, biceps femoris, and pop Chondromalacia of the right knee patella and medial femoral condyle    Plan: After discussion she wished to try a cortisone injection. She is already had therapy and will take anti-inflammatories. Tylenol was not helping.   I do not think she needs surger

## 2021-01-12 NOTE — PROGRESS NOTES
Per verbal order from Dr. Porsche Sanford, draw up 5ml of 0.5% Marcaine and 1ml of Kenalog 40 for cortisone injection to right knee. Lacey Velazquez  Patient provided education handout for cortisone injection.    Patient left office prior to obtaining post injection vit

## 2021-01-14 ENCOUNTER — TELEPHONE (OUTPATIENT)
Dept: ORTHOPEDICS CLINIC | Facility: CLINIC | Age: 61
End: 2021-01-14

## 2021-01-14 NOTE — TELEPHONE ENCOUNTER
Pt calling states she is having side effects from injection no sleep, flush, no fever, sweating,knee stiff and now left knee is having right knee was having before injection and very angulo please advise

## 2021-01-14 NOTE — TELEPHONE ENCOUNTER
As we discussed in office, she is heading toward knee replacement over time. Some options to consider= patella knee sleeve, hyaluronic acid injections and weight loss.

## 2021-01-14 NOTE — TELEPHONE ENCOUNTER
Spoke to patient who received cortisone injection of right knee during office visit on 1/12/2021. States same night started having trouble sleeping, feeling flushed/sweaty, and stiffness of knee.  States Left knee started hurting yesterday as she was going

## 2021-01-15 DIAGNOSIS — E55.9 VITAMIN D DEFICIENCY: ICD-10-CM

## 2021-01-15 RX ORDER — ERGOCALCIFEROL 1.25 MG/1
CAPSULE ORAL
Qty: 12 CAPSULE | Refills: 3 | Status: SHIPPED | OUTPATIENT
Start: 2021-01-15

## 2021-01-15 NOTE — TELEPHONE ENCOUNTER
Refill request for:    Requested Prescriptions     Pending Prescriptions Disp Refills   • ergocalciferol 1.25 MG (75798 UT) Oral Cap 12 capsule 0     Sig: PLEASE SEE ATTACHED FOR DETAILED DIRECTIONS        Last Prescribed Quantity Refills   1/13/2020 12 4

## 2021-01-15 NOTE — TELEPHONE ENCOUNTER
Pt notified per Dr. Hany Batres message. She states she is feeling a little better today, but still some trouble sleeping/sweaty and flushed. She states she had a similar reaction in the past when she had a cortisone injection in her hand.  She denies feeling

## 2021-01-18 ENCOUNTER — TELEPHONE (OUTPATIENT)
Dept: FAMILY MEDICINE CLINIC | Facility: CLINIC | Age: 61
End: 2021-01-18

## 2021-01-18 DIAGNOSIS — I10 ESSENTIAL HYPERTENSION: ICD-10-CM

## 2021-01-18 RX ORDER — LOSARTAN POTASSIUM 50 MG/1
50 TABLET ORAL
Qty: 90 TABLET | Refills: 0 | Status: SHIPPED | OUTPATIENT
Start: 2021-01-18 | End: 2021-02-22

## 2021-02-20 NOTE — ASSESSMENT & PLAN NOTE
Stable, Continue present management.     Thyroid  (most recent labs)   Lab Results   Component Value Date/Time    TSH 1.40 09/18/2020 03:58 PM    T4F 1.4 09/18/2020 03:58 PM    TSHT4 10.19 (H) 03/23/2019 08:11 AM         Endocrine Medications          LEVOT

## 2021-02-22 ENCOUNTER — OFFICE VISIT (OUTPATIENT)
Dept: FAMILY MEDICINE CLINIC | Facility: CLINIC | Age: 61
End: 2021-02-22
Payer: COMMERCIAL

## 2021-02-22 VITALS
SYSTOLIC BLOOD PRESSURE: 120 MMHG | WEIGHT: 177.81 LBS | HEART RATE: 72 BPM | DIASTOLIC BLOOD PRESSURE: 70 MMHG | RESPIRATION RATE: 16 BRPM | HEIGHT: 65 IN | BODY MASS INDEX: 29.62 KG/M2 | TEMPERATURE: 97 F

## 2021-02-22 DIAGNOSIS — R73.03 PREDIABETES: ICD-10-CM

## 2021-02-22 DIAGNOSIS — F33.41 RECURRENT MAJOR DEPRESSIVE DISORDER, IN PARTIAL REMISSION (HCC): ICD-10-CM

## 2021-02-22 DIAGNOSIS — J45.31 MILD PERSISTENT ASTHMA WITH ACUTE EXACERBATION: ICD-10-CM

## 2021-02-22 DIAGNOSIS — I10 ESSENTIAL HYPERTENSION: Primary | ICD-10-CM

## 2021-02-22 DIAGNOSIS — E55.9 VITAMIN D DEFICIENCY: ICD-10-CM

## 2021-02-22 DIAGNOSIS — E03.9 HYPOTHYROIDISM (ACQUIRED): ICD-10-CM

## 2021-02-22 PROCEDURE — 3074F SYST BP LT 130 MM HG: CPT | Performed by: FAMILY MEDICINE

## 2021-02-22 PROCEDURE — 3078F DIAST BP <80 MM HG: CPT | Performed by: FAMILY MEDICINE

## 2021-02-22 PROCEDURE — 3008F BODY MASS INDEX DOCD: CPT | Performed by: FAMILY MEDICINE

## 2021-02-22 PROCEDURE — 99214 OFFICE O/P EST MOD 30 MIN: CPT | Performed by: FAMILY MEDICINE

## 2021-02-22 RX ORDER — LEVOTHYROXINE SODIUM 0.12 MG/1
125 TABLET ORAL DAILY
Qty: 90 TABLET | Refills: 3 | Status: SHIPPED | OUTPATIENT
Start: 2021-02-22

## 2021-02-22 RX ORDER — LOSARTAN POTASSIUM 50 MG/1
50 TABLET ORAL
Qty: 90 TABLET | Refills: 3 | Status: SHIPPED | OUTPATIENT
Start: 2021-02-22 | End: 2021-04-21

## 2021-02-22 NOTE — PROGRESS NOTES
HPI:   Patient presents with:  Blood Pressure: follow up  Other: has a bump in the middle left side of the back     Subjective   Benito Sandhu is a 64year old female who presents for recheck of her hypertension.  She has been taking medications as i time. She appears well-developed and well-nourished. No distress. HENT:   Head: Normocephalic. Neck: Normal range of motion. Cardiovascular: Normal rate, regular rhythm, S1 normal, S2 normal and normal heart sounds. No murmur heard.   Pulses: Oral Tab                 Relevant Medications    Levothyroxine Sodium 125 MCG Oral Tab    Other Relevant Orders    TSH W REFLEX TO FREE T4       Mental Health    Major depression in partial remission ([facility] [address]    Overview     Cymbalta 60 stopped 7/2018, CBT as

## 2021-04-21 DIAGNOSIS — I10 ESSENTIAL HYPERTENSION: ICD-10-CM

## 2021-04-21 RX ORDER — LOSARTAN POTASSIUM 50 MG/1
TABLET ORAL
Qty: 90 TABLET | Refills: 3 | Status: SHIPPED | OUTPATIENT
Start: 2021-04-21

## 2021-04-21 NOTE — TELEPHONE ENCOUNTER
Requested Prescriptions     Pending Prescriptions Disp Refills   • LOSARTAN POTASSIUM 50 MG Oral Tab [Pharmacy Med Name: LOSARTAN 50MG TABLETS] 90 tablet 3     Sig: TAKE 1 TABLET(50 MG) BY MOUTH EVERY DAY     LOV 2/22/2021     Patient was asked to follow-u 90

## 2021-04-23 ENCOUNTER — TELEPHONE (OUTPATIENT)
Dept: FAMILY MEDICINE CLINIC | Facility: CLINIC | Age: 61
End: 2021-04-23

## 2021-04-23 ENCOUNTER — APPOINTMENT (OUTPATIENT)
Dept: ULTRASOUND IMAGING | Age: 61
End: 2021-04-23
Attending: PHYSICIAN ASSISTANT
Payer: COMMERCIAL

## 2021-04-23 ENCOUNTER — HOSPITAL ENCOUNTER (OUTPATIENT)
Age: 61
Discharge: HOME | End: 2021-04-23
Payer: COMMERCIAL

## 2021-04-23 VITALS
HEART RATE: 70 BPM | DIASTOLIC BLOOD PRESSURE: 87 MMHG | TEMPERATURE: 98 F | RESPIRATION RATE: 12 BRPM | OXYGEN SATURATION: 98 % | SYSTOLIC BLOOD PRESSURE: 145 MMHG

## 2021-04-23 DIAGNOSIS — M25.561 CHRONIC PAIN OF RIGHT KNEE: Primary | ICD-10-CM

## 2021-04-23 DIAGNOSIS — G89.29 CHRONIC PAIN OF RIGHT KNEE: Primary | ICD-10-CM

## 2021-04-23 DIAGNOSIS — J01.00 ACUTE NON-RECURRENT MAXILLARY SINUSITIS: ICD-10-CM

## 2021-04-23 PROCEDURE — 99214 OFFICE O/P EST MOD 30 MIN: CPT

## 2021-04-23 PROCEDURE — 93971 EXTREMITY STUDY: CPT | Performed by: PHYSICIAN ASSISTANT

## 2021-04-23 RX ORDER — AZITHROMYCIN 250 MG/1
TABLET, FILM COATED ORAL
Qty: 1 PACKAGE | Refills: 0 | Status: SHIPPED | OUTPATIENT
Start: 2021-04-23 | End: 2021-04-28

## 2021-04-23 NOTE — ED INITIAL ASSESSMENT (HPI)
Pt has had rt leg and behind the knee pain for the past 3 to 4 days . No swelling, but is having some cramping.   Pt has also has sinus headaches and pressure for the past few days

## 2021-04-23 NOTE — ED PROVIDER NOTES
Patient Seen in: [facility]      History   Patient presents with:  Leg Pain  Sinus Problem    Stated Complaint: blood clot right leg    HPI/Subjective:   HPI    Patient is a 43-year-old female.   She arrives to the immediate care with riaz Used    Vaping Use      Vaping Use: Never used    Alcohol use: Yes      Alcohol/week: 0.0 standard drinks      Comment: 1 drink a month    Drug use:  No             Review of Systems    Positive for stated complaint: blood clot right leg  Other systems are were imaged:  Common, deep, and superficial femoral, popliteal, sapheno-femoral junction, posterior tibial veins, and the contralateral common femoral vein.   PATIENT STATED HISTORY: (As transcribed by Technologist)     FINDINGS:  EXTREMITY EXAMINED:  Right Prescribed:  Current Discharge Medication List    START taking these medications    azithromycin (ZITHROMAX Z-WILMAR) 250 MG Oral Tab  500 mg once followed by 250 mg daily x 4 days  Qty: 1 Package Refills: 0

## 2021-04-23 NOTE — TELEPHONE ENCOUNTER
Pt calling in for calf pain x 4 days. Painful to walk and even when sitting down. Pt is also having sinus pain. No HA or nasal drip but painful to the touch.      Suggested a VV but wanted to speak to nurse first

## 2021-04-23 NOTE — TELEPHONE ENCOUNTER
C/o calf pain for last 4 days getting worse back of calf below knee she lives in [facility] so she will go to closest urgent care to be seen to R/O blood clot

## 2021-06-07 ENCOUNTER — LAB ENCOUNTER (OUTPATIENT)
Dept: LAB | Age: 61
End: 2021-06-07
Attending: INTERNAL MEDICINE
Payer: COMMERCIAL

## 2021-06-07 DIAGNOSIS — Z01.818 PRE-OP TESTING: ICD-10-CM

## 2021-06-10 PROBLEM — Z86.0101 HISTORY OF ADENOMATOUS POLYP OF COLON: Status: ACTIVE | Noted: 2021-06-10

## 2021-06-10 PROBLEM — Z86.010 HISTORY OF ADENOMATOUS POLYP OF COLON: Status: ACTIVE | Noted: 2021-06-10

## 2021-08-28 ENCOUNTER — OFFICE VISIT (OUTPATIENT)
Dept: FAMILY MEDICINE CLINIC | Facility: CLINIC | Age: 61
End: 2021-08-28
Payer: COMMERCIAL

## 2021-08-28 VITALS
HEART RATE: 88 BPM | TEMPERATURE: 98 F | DIASTOLIC BLOOD PRESSURE: 64 MMHG | BODY MASS INDEX: 30.32 KG/M2 | RESPIRATION RATE: 16 BRPM | SYSTOLIC BLOOD PRESSURE: 120 MMHG | HEIGHT: 65 IN | WEIGHT: 182 LBS

## 2021-08-28 DIAGNOSIS — R00.2 PALPITATION: ICD-10-CM

## 2021-08-28 DIAGNOSIS — R21 RASH AND NONSPECIFIC SKIN ERUPTION: Primary | ICD-10-CM

## 2021-08-28 PROCEDURE — 3078F DIAST BP <80 MM HG: CPT | Performed by: FAMILY MEDICINE

## 2021-08-28 PROCEDURE — 3008F BODY MASS INDEX DOCD: CPT | Performed by: FAMILY MEDICINE

## 2021-08-28 PROCEDURE — 3074F SYST BP LT 130 MM HG: CPT | Performed by: FAMILY MEDICINE

## 2021-08-28 PROCEDURE — 99214 OFFICE O/P EST MOD 30 MIN: CPT | Performed by: FAMILY MEDICINE

## 2021-08-28 RX ORDER — VALACYCLOVIR HYDROCHLORIDE 1 G/1
1 TABLET, FILM COATED ORAL EVERY 12 HOURS SCHEDULED
Qty: 14 TABLET | Refills: 0 | Status: SHIPPED | OUTPATIENT
Start: 2021-08-28 | End: 2021-09-04

## 2021-08-28 RX ORDER — CEPHALEXIN 500 MG/1
500 CAPSULE ORAL 3 TIMES DAILY
Qty: 21 CAPSULE | Refills: 0 | Status: SHIPPED | OUTPATIENT
Start: 2021-08-28 | End: 2021-11-22 | Stop reason: ALTCHOICE

## 2021-08-28 NOTE — PROGRESS NOTES
Chief Complaint:  Patient presents with:  Edema: Anal swelling between rectum and vagina= painful. Has herpes. Mass: on lower back= fluid filled. Chest Pain: had intermittent sharp pain - 2-3 months ago.   Asthma: ACT 21    HPI:  This is a 63year old fem pain/belching    • GERD (gastroesophageal reflux disease)     worst at night   • Heartburn    • Hemorrhoids    • Herpes simplex without mention of complication    • High cholesterol    • History of depression     several years ago   • History of general an for 7 days. 14 tablet 0   • LOSARTAN POTASSIUM 50 MG Oral Tab TAKE 1 TABLET(50 MG) BY MOUTH EVERY DAY 90 tablet 3   • Levothyroxine Sodium 125 MCG Oral Tab Take 1 tablet (125 mcg total) by mouth daily.  90 tablet 3   • ergocalciferol 1.25 MG (67457 UT) Oral inspection   LUNGS: clear to auscultation bilaterally, no wheezes, rales or rhonchi, good air movement  CV: HRRR, no murmurs, no peripheral edema   EXTREMITIES: warm and well perfused  SKIN: cluster of pustules at gluteal fold, small ulcer to perineal area

## 2021-10-29 ENCOUNTER — TELEPHONE (OUTPATIENT)
Dept: FAMILY MEDICINE CLINIC | Facility: CLINIC | Age: 61
End: 2021-10-29

## 2021-10-29 NOTE — TELEPHONE ENCOUNTER
C/o feeling cold with cough and nasal congestion.  She will put off the flu immunization and go get tested for covid

## 2021-10-29 NOTE — TELEPHONE ENCOUNTER
Pt requesting to speak to a nurse regarding a persistent cough. She's scheduled for a flu shot today, but doesn't know if she should still have the flu shot. She would also like to discuss COVID symptoms.

## 2021-11-22 ENCOUNTER — OFFICE VISIT (OUTPATIENT)
Dept: FAMILY MEDICINE CLINIC | Facility: CLINIC | Age: 61
End: 2021-11-22
Payer: COMMERCIAL

## 2021-11-22 VITALS
HEART RATE: 70 BPM | DIASTOLIC BLOOD PRESSURE: 62 MMHG | HEIGHT: 65 IN | WEIGHT: 188.19 LBS | BODY MASS INDEX: 31.35 KG/M2 | RESPIRATION RATE: 16 BRPM | SYSTOLIC BLOOD PRESSURE: 124 MMHG

## 2021-11-22 DIAGNOSIS — Z12.31 VISIT FOR SCREENING MAMMOGRAM: ICD-10-CM

## 2021-11-22 DIAGNOSIS — Z00.00 LABORATORY EXAMINATION ORDERED AS PART OF A ROUTINE GENERAL MEDICAL EXAMINATION: ICD-10-CM

## 2021-11-22 DIAGNOSIS — E55.9 VITAMIN D DEFICIENCY: ICD-10-CM

## 2021-11-22 DIAGNOSIS — R73.03 BORDERLINE DIABETES: ICD-10-CM

## 2021-11-22 DIAGNOSIS — R07.9 CHEST PAIN, UNSPECIFIED TYPE: ICD-10-CM

## 2021-11-22 DIAGNOSIS — E03.9 HYPOTHYROIDISM (ACQUIRED): Primary | ICD-10-CM

## 2021-11-22 DIAGNOSIS — R73.9 HYPERGLYCEMIA: ICD-10-CM

## 2021-11-22 DIAGNOSIS — I10 ESSENTIAL HYPERTENSION: ICD-10-CM

## 2021-11-22 PROCEDURE — 90471 IMMUNIZATION ADMIN: CPT | Performed by: FAMILY MEDICINE

## 2021-11-22 PROCEDURE — 3078F DIAST BP <80 MM HG: CPT | Performed by: FAMILY MEDICINE

## 2021-11-22 PROCEDURE — 3074F SYST BP LT 130 MM HG: CPT | Performed by: FAMILY MEDICINE

## 2021-11-22 PROCEDURE — 3008F BODY MASS INDEX DOCD: CPT | Performed by: FAMILY MEDICINE

## 2021-11-22 PROCEDURE — 90686 IIV4 VACC NO PRSV 0.5 ML IM: CPT | Performed by: FAMILY MEDICINE

## 2021-11-22 PROCEDURE — 99214 OFFICE O/P EST MOD 30 MIN: CPT | Performed by: FAMILY MEDICINE

## 2021-11-22 RX ORDER — ERGOCALCIFEROL 1.25 MG/1
50000 CAPSULE ORAL WEEKLY
Qty: 12 CAPSULE | Refills: 4 | Status: SHIPPED | OUTPATIENT
Start: 2021-11-22

## 2021-11-22 NOTE — PROGRESS NOTES
Lizzy France is a 63year old female coming in for had concerns including Medication Request (thyroid medication ), Immunization/Injection (flushot, wants to talk about getting the booster), and Memory Loss (started this summer ).     Subjective:   HPI better but Lab Results   Component Value Date/Time    TSH 1.40 09/18/2020 03:58 PM    T4F 1.4 09/18/2020 03:58 PM    TSHT4 10.19 (H) 03/23/2019 08:11 AM         Endocrine Medications          Levothyroxine Sodium 125 MCG Oral Tab          Orders:  -     Assay, Thyr also changed her ergocalciferol. Additionally, I am having her maintain her Clobetasol Propionate, albuterol, OptiChamber Kami, naproxen, ergocalciferol, levothyroxine, and losartan. Return in about 6 months (around 5/22/2022) for recheck.     Osmin Magallanes

## 2021-12-14 ENCOUNTER — LAB ENCOUNTER (OUTPATIENT)
Dept: LAB | Facility: HOSPITAL | Age: 61
End: 2021-12-14
Attending: FAMILY MEDICINE
Payer: COMMERCIAL

## 2021-12-14 DIAGNOSIS — R07.9 CHEST PAIN, UNSPECIFIED TYPE: ICD-10-CM

## 2021-12-17 ENCOUNTER — HOSPITAL ENCOUNTER (OUTPATIENT)
Dept: CV DIAGNOSTICS | Facility: HOSPITAL | Age: 61
Discharge: HOME | End: 2021-12-17
Attending: FAMILY MEDICINE
Payer: COMMERCIAL

## 2021-12-17 DIAGNOSIS — R07.9 CHEST PAIN, UNSPECIFIED TYPE: ICD-10-CM

## 2021-12-17 PROCEDURE — 93350 STRESS TTE ONLY: CPT | Performed by: FAMILY MEDICINE

## 2021-12-17 PROCEDURE — 93018 CV STRESS TEST I&R ONLY: CPT | Performed by: FAMILY MEDICINE

## 2021-12-17 PROCEDURE — 93017 CV STRESS TEST TRACING ONLY: CPT | Performed by: FAMILY MEDICINE

## 2022-02-17 RX ORDER — LEVOTHYROXINE SODIUM 0.12 MG/1
125 TABLET ORAL DAILY
Qty: 90 TABLET | Refills: 0 | Status: SHIPPED | OUTPATIENT
Start: 2022-02-17 | End: 2022-03-17

## 2022-03-17 RX ORDER — LEVOTHYROXINE SODIUM 0.12 MG/1
TABLET ORAL
Qty: 90 TABLET | Refills: 0 | Status: SHIPPED | OUTPATIENT
Start: 2022-03-17

## 2022-04-21 RX ORDER — LOSARTAN POTASSIUM 50 MG/1
TABLET ORAL
Qty: 90 TABLET | Refills: 0 | Status: SHIPPED | OUTPATIENT
Start: 2022-04-21

## 2022-04-29 ENCOUNTER — PATIENT OUTREACH (OUTPATIENT)
Dept: FAMILY MEDICINE CLINIC | Facility: CLINIC | Age: 62
End: 2022-04-29

## 2022-05-09 ENCOUNTER — TELEPHONE (OUTPATIENT)
Dept: FAMILY MEDICINE CLINIC | Facility: CLINIC | Age: 62
End: 2022-05-09

## 2022-05-09 RX ORDER — ALBUTEROL SULFATE 90 UG/1
2 AEROSOL, METERED RESPIRATORY (INHALATION) EVERY 6 HOURS PRN
Qty: 1 EACH | Refills: 0 | Status: SHIPPED | OUTPATIENT
Start: 2022-05-09

## 2022-05-09 NOTE — TELEPHONE ENCOUNTER
Pt states her asthma has been well controlled for a long time, but she has been having difficulty over the last two weeks. Current albuterol inhaler is . She is requesting a new one. ACT 14 today. Albuterol is refilled. She is asked to f/u if asthma sx remain uncontrolled.

## 2022-06-08 DIAGNOSIS — F33.41 RECURRENT MAJOR DEPRESSIVE DISORDER, IN PARTIAL REMISSION (HCC): ICD-10-CM

## 2022-06-09 RX ORDER — LEVOTHYROXINE SODIUM 0.12 MG/1
TABLET ORAL
Qty: 90 TABLET | Refills: 0 | Status: SHIPPED | OUTPATIENT
Start: 2022-06-09

## 2022-06-15 ENCOUNTER — TELEPHONE (OUTPATIENT)
Dept: FAMILY MEDICINE CLINIC | Facility: CLINIC | Age: 62
End: 2022-06-15

## 2022-06-15 RX ORDER — BUDESONIDE AND FORMOTEROL FUMARATE DIHYDRATE 160; 4.5 UG/1; UG/1
2 AEROSOL RESPIRATORY (INHALATION) 2 TIMES DAILY
COMMUNITY
Start: 2022-05-09

## 2022-06-15 RX ORDER — CODEINE PHOSPHATE AND GUAIFENESIN 10; 100 MG/5ML; MG/5ML
SOLUTION ORAL
COMMUNITY
Start: 2022-06-15 | End: 2022-06-17

## 2022-06-15 NOTE — TELEPHONE ENCOUNTER
Scheduled Friday with me for follow up rib fracture stating needs to be seen ASAP. .. What ER was she seen at? No [facility] records. Need to review why she needs to be seen urgently.

## 2022-06-15 NOTE — TELEPHONE ENCOUNTER
Has broken rib fell Sunday went [facility] rib on left side is broken possibly 5th rib they were to send over the records for review. She has codiene cough syrup and is using tylenol and motrin for pain. I told her to splint with a pillow and we will see her Friday.

## 2022-06-17 ENCOUNTER — OFFICE VISIT (OUTPATIENT)
Dept: FAMILY MEDICINE CLINIC | Facility: CLINIC | Age: 62
End: 2022-06-17
Payer: COMMERCIAL

## 2022-06-17 VITALS
SYSTOLIC BLOOD PRESSURE: 118 MMHG | BODY MASS INDEX: 32.49 KG/M2 | DIASTOLIC BLOOD PRESSURE: 70 MMHG | HEIGHT: 65 IN | WEIGHT: 195 LBS | HEART RATE: 64 BPM | RESPIRATION RATE: 16 BRPM | OXYGEN SATURATION: 97 % | TEMPERATURE: 98 F

## 2022-06-17 DIAGNOSIS — M25.512 ACUTE PAIN OF LEFT SHOULDER: Primary | ICD-10-CM

## 2022-06-17 DIAGNOSIS — S22.32XD CLOSED FRACTURE OF ONE RIB OF LEFT SIDE WITH ROUTINE HEALING, SUBSEQUENT ENCOUNTER: ICD-10-CM
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
## 2022-06-17 PROCEDURE — 3078F DIAST BP <80 MM HG: CPT | Performed by: PHYSICIAN ASSISTANT

## 2022-06-17 PROCEDURE — 99214 OFFICE O/P EST MOD 30 MIN: CPT | Performed by: PHYSICIAN ASSISTANT

## 2022-06-17 PROCEDURE — 3074F SYST BP LT 130 MM HG: CPT | Performed by: PHYSICIAN ASSISTANT

## 2022-06-17 PROCEDURE — 3008F BODY MASS INDEX DOCD: CPT | Performed by: PHYSICIAN ASSISTANT

## 2022-06-17 RX ORDER — CYCLOBENZAPRINE HCL 5 MG
5 TABLET ORAL 3 TIMES DAILY PRN
Qty: 20 TABLET | Refills: 0 | Status: SHIPPED | OUTPATIENT
Start: 2022-06-17

## 2022-06-17 RX ORDER — CODEINE PHOSPHATE AND GUAIFENESIN 10; 100 MG/5ML; MG/5ML
5 SOLUTION ORAL 3 TIMES DAILY PRN
Qty: 180 ML | Refills: 0 | Status: SHIPPED | OUTPATIENT
Start: 2022-06-17

## 2022-06-20 ENCOUNTER — TELEPHONE (OUTPATIENT)
Dept: FAMILY MEDICINE CLINIC | Facility: CLINIC | Age: 62
End: 2022-06-20

## 2022-06-20 DIAGNOSIS — M25.512 ACUTE PAIN OF LEFT SHOULDER: Primary | ICD-10-CM

## 2022-06-20 NOTE — TELEPHONE ENCOUNTER
Pt calling. States she is having severe shoulder pain. Pt was in last week for rib pain and that is getting better. Pain is mainly on left side. Has been taking muscle relaxer but it is not helping with her shoulder like they thought it would.

## 2022-06-20 NOTE — TELEPHONE ENCOUNTER
Pt states L shoulder pain is getting worse. Constant pain, muscle relaxed made pain worse per Pt. Pt taking Ibuprofen and Lidocaine patches but very uncomfortable. Pt seen 6/17/22- office note nit in yet and was at ER at [facility] 6/14/22.   Please advise

## 2022-06-20 NOTE — TELEPHONE ENCOUNTER
I don't believe so, I think just rib x-ray as she fell onto ribs. I do not believe that she fell onto shoulder. OK to order right shoulder x-ray if interested.

## 2022-06-20 NOTE — TELEPHONE ENCOUNTER
I thought the muscle relaxer would help with back pain, not necessarily shoulder pain. The ibuprofen should be helping that. Can see ortho if shoulder getting worse.

## 2022-07-25 RX ORDER — MELOXICAM 15 MG/1
15 TABLET ORAL DAILY
COMMUNITY
Start: 2022-07-12 | End: 2022-07-27 | Stop reason: ALTCHOICE

## 2022-07-26 NOTE — ASSESSMENT & PLAN NOTE
As for her Pre-Diabetes, it is well controlled, no significant medication side effects noted. Recommendations are: continue present meds, lose weight by increased dietary compliance and exercise and will check labs as ordered.     Lab Results   Component Value Date    A1C 5.8 (H) 11/24/2021    A1C 5.7 (H) 09/18/2020

## 2022-07-26 NOTE — ASSESSMENT & PLAN NOTE
Stable, Continue present management.     Thyroid  (most recent labs)   Lab Results   Component Value Date/Time    TSH 1.31 11/24/2021 07:45 AM    T4F 1.3 11/24/2021 07:45 AM    TSHT4 10.19 (H) 03/23/2019 08:11 AM         Endocrine Medications          LEVOTHYROXINE 125 MCG Oral Tab

## 2022-07-27 ENCOUNTER — OFFICE VISIT (OUTPATIENT)
Dept: FAMILY MEDICINE CLINIC | Facility: CLINIC | Age: 62
End: 2022-07-27
Payer: COMMERCIAL

## 2022-07-27 VITALS
SYSTOLIC BLOOD PRESSURE: 132 MMHG | TEMPERATURE: 97 F | HEART RATE: 78 BPM | BODY MASS INDEX: 33.15 KG/M2 | HEIGHT: 65 IN | DIASTOLIC BLOOD PRESSURE: 80 MMHG | WEIGHT: 199 LBS | RESPIRATION RATE: 16 BRPM

## 2022-07-27 DIAGNOSIS — R73.9 HYPERGLYCEMIA: ICD-10-CM

## 2022-07-27 DIAGNOSIS — E03.9 HYPOTHYROIDISM (ACQUIRED): ICD-10-CM

## 2022-07-27 DIAGNOSIS — Z12.31 VISIT FOR SCREENING MAMMOGRAM: ICD-10-CM

## 2022-07-27 DIAGNOSIS — R73.03 PREDIABETES: Primary | ICD-10-CM

## 2022-07-27 DIAGNOSIS — I10 ESSENTIAL HYPERTENSION: ICD-10-CM

## 2022-07-27 DIAGNOSIS — Z00.00 LABORATORY EXAMINATION ORDERED AS PART OF A ROUTINE GENERAL MEDICAL EXAMINATION: ICD-10-CM

## 2022-07-27 PROCEDURE — 99214 OFFICE O/P EST MOD 30 MIN: CPT | Performed by: FAMILY MEDICINE

## 2022-07-27 PROCEDURE — 3079F DIAST BP 80-89 MM HG: CPT | Performed by: FAMILY MEDICINE

## 2022-07-27 PROCEDURE — 3075F SYST BP GE 130 - 139MM HG: CPT | Performed by: FAMILY MEDICINE

## 2022-07-27 PROCEDURE — 3008F BODY MASS INDEX DOCD: CPT | Performed by: FAMILY MEDICINE

## 2022-07-27 RX ORDER — CODEINE PHOSPHATE AND GUAIFENESIN 10; 100 MG/5ML; MG/5ML
5 SOLUTION ORAL 3 TIMES DAILY PRN
Qty: 180 ML | Refills: 0 | Status: SHIPPED | OUTPATIENT
Start: 2022-07-27

## 2022-08-10 LAB
ALBUMIN/GLOBULIN RATIO: 1.7 (CALC) (ref 1–2.5)
ALBUMIN: 4 G/DL (ref 3.6–5.1)
ALKALINE PHOSPHATASE: 79 U/L (ref 37–153)
ALT: 15 U/L (ref 6–29)
AST: 15 U/L (ref 10–35)
BILIRUBIN, TOTAL: 0.4 MG/DL (ref 0.2–1.2)
BUN: 16 MG/DL (ref 7–25)
CALCIUM: 9.3 MG/DL (ref 8.6–10.4)
CARBON DIOXIDE: 26 MMOL/L (ref 20–32)
CHLORIDE: 105 MMOL/L (ref 98–110)
CHOL/HDLC RATIO: 3.2 (CALC)
CHOLESTEROL, TOTAL: 185 MG/DL
CREATININE: 0.76 MG/DL (ref 0.5–1.05)
EGFR: 90 ML/MIN/1.73M2
GLOBULIN: 2.4 G/DL (CALC) (ref 1.9–3.7)
GLUCOSE: 115 MG/DL (ref 65–99)
HDL CHOLESTEROL: 57 MG/DL
HEMATOCRIT: 43.1 % (ref 35–45)
HEMOGLOBIN A1C: 5.8 % OF TOTAL HGB
HEMOGLOBIN: 14 G/DL (ref 11.7–15.5)
LDL-CHOLESTEROL: 107 MG/DL (CALC)
MCH: 27.8 PG (ref 27–33)
MCHC: 32.5 G/DL (ref 32–36)
MCV: 85.7 FL (ref 80–100)
MPV: 10.8 FL (ref 7.5–12.5)
NON-HDL CHOLESTEROL: 128 MG/DL (CALC)
PLATELET COUNT: 251 THOUSAND/UL (ref 140–400)
POTASSIUM: 4.5 MMOL/L (ref 3.5–5.3)
PROTEIN, TOTAL: 6.4 G/DL (ref 6.1–8.1)
RDW: 14 % (ref 11–15)
RED BLOOD CELL COUNT: 5.03 MILLION/UL (ref 3.8–5.1)
SODIUM: 138 MMOL/L (ref 135–146)
TRIGLYCERIDES: 115 MG/DL
TSH W/REFLEX TO FT4: 4.25 MIU/L (ref 0.4–4.5)
WHITE BLOOD CELL COUNT: 5.2 THOUSAND/UL (ref 3.8–10.8)

## 2022-09-02 DIAGNOSIS — F33.41 RECURRENT MAJOR DEPRESSIVE DISORDER, IN PARTIAL REMISSION (HCC): ICD-10-CM

## 2022-09-06 RX ORDER — LEVOTHYROXINE SODIUM 0.12 MG/1
TABLET ORAL
Qty: 90 TABLET | Refills: 0 | Status: SHIPPED | OUTPATIENT
Start: 2022-09-06

## 2022-10-03 ENCOUNTER — TELEPHONE (OUTPATIENT)
Dept: FAMILY MEDICINE CLINIC | Facility: CLINIC | Age: 62
End: 2022-10-03

## 2022-10-03 NOTE — TELEPHONE ENCOUNTER
Patient reports sore throat for 1 month that will not go away. She has felt more tired during this time. Initally thought this was just allergies and would go away. No spots seen in her throat. She is coughing and reports glands are swollen. Does have chronic cough normally. Has not done any covid testing during this time. Her rosacea has also flared up which it has not in a very long time. Advised should be seen. No availability in office. Will [facility] to [facility]. Patient verbalized understanding of information given.

## 2022-10-03 NOTE — TELEPHONE ENCOUNTER
Pt has had a sore throat for 1 month and now rosacea is happening on her cheeks and nose.  Derm gave her azithromycin for the last two days and she has also been really tired more than normal.

## 2022-10-15 DIAGNOSIS — I10 ESSENTIAL HYPERTENSION: ICD-10-CM

## 2022-10-17 RX ORDER — LOSARTAN POTASSIUM 50 MG/1
TABLET ORAL
Qty: 90 TABLET | Refills: 1 | Status: SHIPPED | OUTPATIENT
Start: 2022-10-17

## 2022-10-21 ENCOUNTER — HOSPITAL ENCOUNTER (OUTPATIENT)
Age: 62
Discharge: HOME | End: 2022-10-21
Payer: COMMERCIAL

## 2022-10-21 VITALS
HEART RATE: 80 BPM | DIASTOLIC BLOOD PRESSURE: 73 MMHG | RESPIRATION RATE: 18 BRPM | OXYGEN SATURATION: 98 % | SYSTOLIC BLOOD PRESSURE: 116 MMHG | TEMPERATURE: 99 F

## 2022-10-21 DIAGNOSIS — R07.0 THROAT PAIN IN ADULT: Primary | ICD-10-CM

## 2022-10-21 DIAGNOSIS — R05.3 CHRONIC COUGH: ICD-10-CM

## 2022-10-21 LAB — S PYO AG THROAT QL: NEGATIVE

## 2022-10-21 NOTE — ED INITIAL ASSESSMENT (HPI)
Pt c/o sore throat x2 mos, swelling intermittently x2 mos. States usually has a cough, cough with \"smelly cheesy\" phlegm x6 weeks. No fever.

## 2022-10-22 ENCOUNTER — HOSPITAL ENCOUNTER (OUTPATIENT)
Dept: MAMMOGRAPHY | Facility: HOSPITAL | Age: 62
Discharge: HOME | End: 2022-10-22
Attending: FAMILY MEDICINE
Payer: COMMERCIAL

## 2022-10-22 DIAGNOSIS — Z12.31 VISIT FOR SCREENING MAMMOGRAM: ICD-10-CM

## 2022-10-22 DIAGNOSIS — Z12.31 ENCOUNTER FOR SCREENING MAMMOGRAM FOR MALIGNANT NEOPLASM OF BREAST: ICD-10-CM

## 2022-10-22 PROCEDURE — 77063 BREAST TOMOSYNTHESIS BI: CPT | Performed by: FAMILY MEDICINE

## 2022-10-22 PROCEDURE — 77067 SCR MAMMO BI INCL CAD: CPT | Performed by: FAMILY MEDICINE

## 2022-11-29 DIAGNOSIS — F33.41 RECURRENT MAJOR DEPRESSIVE DISORDER, IN PARTIAL REMISSION (HCC): ICD-10-CM

## 2022-11-29 RX ORDER — LEVOTHYROXINE SODIUM 0.12 MG/1
TABLET ORAL
Qty: 90 TABLET | Refills: 0 | Status: SHIPPED | OUTPATIENT
Start: 2022-11-29

## 2022-12-15 ENCOUNTER — TELEPHONE (OUTPATIENT)
Dept: FAMILY MEDICINE CLINIC | Facility: CLINIC | Age: 62
End: 2022-12-15

## 2022-12-15 DIAGNOSIS — U07.1 COVID-19: Primary | ICD-10-CM

## 2022-12-15 NOTE — TELEPHONE ENCOUNTER
Called and talked to patient and went over instructions for covid and sent them via My Chart.  With her history will ask provider about giving paxlovid we do not have openings today

## 2022-12-15 NOTE — TELEPHONE ENCOUNTER
Patient is Covid positive, symptoms started Tuesday night, started blowing out deep yellow mucus this morning, productive cough, fatigue, short of breath.

## 2022-12-15 NOTE — TELEPHONE ENCOUNTER
Sent but should double check with pharmacist for interactions. Please let me know if you have any questions.   [id number], DO 12/15/2022 5:07 PM

## 2023-01-04 ENCOUNTER — TELEPHONE (OUTPATIENT)
Dept: FAMILY MEDICINE CLINIC | Facility: CLINIC | Age: 63
End: 2023-01-04

## 2023-01-04 NOTE — TELEPHONE ENCOUNTER
Patient reports that several weeks ago she had covid and took paxlovid. Felt better for a couple days and got sick again. Assuming this was influenza but did not have a test.   Her cough was bad with covid and then continued. History of asthma. Will have bad coughing fits to where she cannot catch her breath. Spitting up \"clear foamy stuff\". Has tried mucinex with slight relief, sudafed, ibuprofen. Rescue inhaler is not \"stopping it from happening\". Tried steroids (5 mg) that she had at home for a couple days. Only slight relief. Advised should be seen. Will go to [facility] for evaluation. Patient verbalized understanding of information given.

## 2023-01-04 NOTE — TELEPHONE ENCOUNTER
Patient had covid 3 wks ago and then the flu. Patient is complaining of a horrible cough. Pt is not able to sleep at night. Cough is causing patient to be out of breath. Pt requesting to speak to nurse.  Please call

## 2023-01-14 NOTE — ASSESSMENT & PLAN NOTE
As for her Pre-Diabetes, it is well controlled, no significant medication side effects noted. Recommendations are: continue present meds, lose weight by increased dietary compliance and exercise and will check labs as ordered.     Lab Results   Component Value Date    A1C 5.8 (H) 08/09/2022    A1C 5.8 (H) 11/24/2021

## 2023-01-14 NOTE — ASSESSMENT & PLAN NOTE
Stable, Continue present management.     Thyroid  (most recent labs)   Lab Results   Component Value Date/Time    TSH 1.31 11/24/2021 07:45 AM    T4F 1.3 11/24/2021 07:45 AM    TSHT4 4.25 08/09/2022 07:04 AM         Endocrine Medications          LEVOTHYROXINE 125 MCG Oral Tab

## 2023-01-16 ENCOUNTER — OFFICE VISIT (OUTPATIENT)
Dept: FAMILY MEDICINE CLINIC | Facility: CLINIC | Age: 63
End: 2023-01-16
Payer: COMMERCIAL

## 2023-01-16 VITALS
SYSTOLIC BLOOD PRESSURE: 134 MMHG | HEART RATE: 78 BPM | HEIGHT: 65 IN | DIASTOLIC BLOOD PRESSURE: 64 MMHG | BODY MASS INDEX: 33.43 KG/M2 | WEIGHT: 200.63 LBS | RESPIRATION RATE: 18 BRPM

## 2023-01-16 DIAGNOSIS — R73.03 PREDIABETES: ICD-10-CM

## 2023-01-16 DIAGNOSIS — E66.9 OBESITY WITH BODY MASS INDEX OF 30.0-39.9: ICD-10-CM

## 2023-01-16 DIAGNOSIS — Z00.00 ANNUAL PHYSICAL EXAM: Primary | ICD-10-CM

## 2023-01-16 DIAGNOSIS — R73.9 HYPERGLYCEMIA: ICD-10-CM

## 2023-01-16 DIAGNOSIS — Z00.00 LABORATORY EXAMINATION ORDERED AS PART OF A ROUTINE GENERAL MEDICAL EXAMINATION: ICD-10-CM

## 2023-01-16 DIAGNOSIS — E55.9 VITAMIN D DEFICIENCY: ICD-10-CM

## 2023-01-16 DIAGNOSIS — F33.41 RECURRENT MAJOR DEPRESSIVE DISORDER, IN PARTIAL REMISSION (HCC): ICD-10-CM

## 2023-01-16 DIAGNOSIS — J01.41 ACUTE RECURRENT PANSINUSITIS: ICD-10-CM

## 2023-01-16 DIAGNOSIS — I10 ESSENTIAL HYPERTENSION: ICD-10-CM

## 2023-01-16 DIAGNOSIS — J45.31 MILD PERSISTENT ASTHMA WITH ACUTE EXACERBATION: ICD-10-CM

## 2023-01-16 DIAGNOSIS — E03.9 HYPOTHYROIDISM (ACQUIRED): ICD-10-CM

## 2023-01-16 PROCEDURE — 3078F DIAST BP <80 MM HG: CPT | Performed by: FAMILY MEDICINE

## 2023-01-16 PROCEDURE — 3075F SYST BP GE 130 - 139MM HG: CPT | Performed by: FAMILY MEDICINE

## 2023-01-16 PROCEDURE — 3008F BODY MASS INDEX DOCD: CPT | Performed by: FAMILY MEDICINE

## 2023-01-16 PROCEDURE — 99396 PREV VISIT EST AGE 40-64: CPT | Performed by: FAMILY MEDICINE

## 2023-01-16 RX ORDER — CEFUROXIME AXETIL 500 MG/1
500 TABLET ORAL 2 TIMES DAILY
Qty: 20 TABLET | Refills: 0 | Status: SHIPPED | OUTPATIENT
Start: 2023-01-16 | End: 2023-01-26

## 2023-01-16 RX ORDER — LOSARTAN POTASSIUM 50 MG/1
50 TABLET ORAL DAILY
Qty: 90 TABLET | Refills: 3 | Status: SHIPPED | OUTPATIENT
Start: 2023-01-16

## 2023-01-16 RX ORDER — LEVOTHYROXINE SODIUM 0.12 MG/1
125 TABLET ORAL DAILY
Qty: 90 TABLET | Refills: 3 | Status: SHIPPED | OUTPATIENT
Start: 2023-01-16

## 2023-01-16 RX ORDER — ORAL SEMAGLUTIDE 3 MG/1
3 TABLET ORAL
Qty: 30 TABLET | Refills: 0 | Status: SHIPPED | OUTPATIENT
Start: 2023-01-16 | End: 2023-02-15

## 2023-01-16 RX ORDER — PREDNISONE 10 MG/1
TABLET ORAL
Qty: 35 TABLET | Refills: 0 | Status: SHIPPED | OUTPATIENT
Start: 2023-01-16 | End: 2023-02-05

## 2023-01-16 RX ORDER — AZITHROMYCIN 250 MG/1
TABLET, FILM COATED ORAL
Refills: 0 | Status: CANCELLED | OUTPATIENT
Start: 2023-01-16

## 2023-01-16 RX ORDER — ERGOCALCIFEROL 1.25 MG/1
CAPSULE ORAL
Qty: 12 CAPSULE | Refills: 3 | Status: SHIPPED | OUTPATIENT
Start: 2023-01-16

## 2023-01-23 ENCOUNTER — TELEPHONE (OUTPATIENT)
Dept: FAMILY MEDICINE CLINIC | Facility: CLINIC | Age: 63
End: 2023-01-23

## 2023-01-23 NOTE — TELEPHONE ENCOUNTER
Received fax from [employer], prior authorization required for Rybelsus 3 MG Tablets.  Called patient and left message on machine explaining process, fax placed in Pat's in box

## 2023-01-27 NOTE — TELEPHONE ENCOUNTER
coverage for Rybelsus Tab 3mg, use as directed (30 per month), is denied. This decision is based on health plan criteria for Rybelsus.  This medicine is covered only if:  You have a diagnosis of type 2 diabetes mellitus confirmed by accepted laboratory testing  methodologies per treatment guidelines (for example, A1C greater than or equal to 6.5%,    Case Number: [id number]    Message forward to Dr Mercedes Mota

## 2023-02-07 NOTE — ED INITIAL ASSESSMENT (HPI)
C/O cough that has worsened for the last few days and is more congested. Dermatologist sent her here. [Cardiac Failure] : cardiac failure [Structural Heart and Valve Disease] : structural heart and valve disease [Hyperlipidemia] : hyperlipidemia [Hypertension] : hypertension [Coronary Artery Disease] : coronary artery disease

## 2023-02-16 ENCOUNTER — OFFICE VISIT (OUTPATIENT)
Facility: CLINIC | Age: 63
End: 2023-02-16
Payer: COMMERCIAL

## 2023-02-16 VITALS
BODY MASS INDEX: 33.66 KG/M2 | SYSTOLIC BLOOD PRESSURE: 136 MMHG | HEIGHT: 65 IN | DIASTOLIC BLOOD PRESSURE: 78 MMHG | OXYGEN SATURATION: 97 % | WEIGHT: 202 LBS | HEART RATE: 82 BPM | RESPIRATION RATE: 16 BRPM

## 2023-02-16 DIAGNOSIS — R05.3 CHRONIC COUGH: Primary | ICD-10-CM

## 2023-02-16 PROCEDURE — 3008F BODY MASS INDEX DOCD: CPT | Performed by: INTERNAL MEDICINE

## 2023-02-16 PROCEDURE — 99204 OFFICE O/P NEW MOD 45 MIN: CPT | Performed by: INTERNAL MEDICINE

## 2023-02-16 PROCEDURE — 3078F DIAST BP <80 MM HG: CPT | Performed by: INTERNAL MEDICINE

## 2023-02-16 PROCEDURE — 3075F SYST BP GE 130 - 139MM HG: CPT | Performed by: INTERNAL MEDICINE

## 2023-02-16 RX ORDER — MONTELUKAST SODIUM 10 MG/1
10 TABLET ORAL NIGHTLY
Qty: 30 TABLET | Refills: 5 | Status: SHIPPED | OUTPATIENT
Start: 2023-02-16

## 2023-02-16 RX ORDER — AZELASTINE HYDROCHLORIDE, FLUTICASONE PROPIONATE 137; 50 UG/1; UG/1
1 SPRAY, METERED NASAL 2 TIMES DAILY
Qty: 1 EACH | Refills: 5 | Status: SHIPPED | OUTPATIENT
Start: 2023-02-16

## 2023-02-16 NOTE — PATIENT INSTRUCTIONS
Plan-- to get blood work - in 1-2 months          -- to begin dymista nasal spray          - to begin singular every night          -  To begin xzyal every night           - add sinus rinse as able          - see me in 2 months          -     Juan Ramon Goldman MD  Pulmonary Medicine  2/16/2023

## 2023-04-25 ENCOUNTER — OFFICE VISIT (OUTPATIENT)
Facility: CLINIC | Age: 63
End: 2023-04-25
Payer: COMMERCIAL

## 2023-04-25 VITALS
BODY MASS INDEX: 33.46 KG/M2 | HEIGHT: 65 IN | OXYGEN SATURATION: 98 % | DIASTOLIC BLOOD PRESSURE: 68 MMHG | SYSTOLIC BLOOD PRESSURE: 140 MMHG | HEART RATE: 77 BPM | WEIGHT: 200.81 LBS | RESPIRATION RATE: 16 BRPM

## 2023-04-25 DIAGNOSIS — J47.9 BRONCHIECTASIS WITHOUT COMPLICATION (HCC): Primary | ICD-10-CM

## 2023-04-25 LAB
ALBUMIN/GLOBULIN RATIO: 1.6 (CALC) (ref 1–2.5)
ALBUMIN: 3.9 G/DL (ref 3.6–5.1)
ALKALINE PHOSPHATASE: 84 U/L (ref 37–153)
ALT: 16 U/L (ref 6–29)
AST: 15 U/L (ref 10–35)
BILIRUBIN, TOTAL: 0.6 MG/DL (ref 0.2–1.2)
BUN: 14 MG/DL (ref 7–25)
CALCIUM: 8.9 MG/DL (ref 8.6–10.4)
CARBON DIOXIDE: 26 MMOL/L (ref 20–32)
CHLORIDE: 108 MMOL/L (ref 98–110)
CHOL/HDLC RATIO: 3.3 (CALC)
CHOLESTEROL, TOTAL: 175 MG/DL
CREATININE: 0.65 MG/DL (ref 0.5–1.05)
EGFR: 101 ML/MIN/1.73M2
GLOBULIN: 2.5 G/DL (CALC) (ref 1.9–3.7)
GLUCOSE: 118 MG/DL (ref 65–99)
HDL CHOLESTEROL: 53 MG/DL
HEMATOCRIT: 42.3 % (ref 35–45)
HEMOGLOBIN A1C: 6.1 % OF TOTAL HGB
HEMOGLOBIN: 13.7 G/DL (ref 11.7–15.5)
LDL-CHOLESTEROL: 101 MG/DL (CALC)
MCH: 27.3 PG (ref 27–33)
MCHC: 32.4 G/DL (ref 32–36)
MCV: 84.4 FL (ref 80–100)
MPV: 11 FL (ref 7.5–12.5)
NON-HDL CHOLESTEROL: 122 MG/DL (CALC)
PLATELET COUNT: 255 THOUSAND/UL (ref 140–400)
POTASSIUM: 4.3 MMOL/L (ref 3.5–5.3)
PROTEIN, TOTAL: 6.4 G/DL (ref 6.1–8.1)
RDW: 13.5 % (ref 11–15)
RED BLOOD CELL COUNT: 5.01 MILLION/UL (ref 3.8–5.1)
SODIUM: 139 MMOL/L (ref 135–146)
TRIGLYCERIDES: 111 MG/DL
VITAMIN D, 25-OH, TOTAL: 51 NG/ML (ref 30–100)
WHITE BLOOD CELL COUNT: 5.4 THOUSAND/UL (ref 3.8–10.8)

## 2023-04-25 PROCEDURE — 3008F BODY MASS INDEX DOCD: CPT | Performed by: INTERNAL MEDICINE

## 2023-04-25 PROCEDURE — 3078F DIAST BP <80 MM HG: CPT | Performed by: INTERNAL MEDICINE

## 2023-04-25 PROCEDURE — 99214 OFFICE O/P EST MOD 30 MIN: CPT | Performed by: INTERNAL MEDICINE

## 2023-04-25 PROCEDURE — 3077F SYST BP >= 140 MM HG: CPT | Performed by: INTERNAL MEDICINE

## 2023-04-25 NOTE — PATIENT INSTRUCTIONS
Plan--   Take flonase 1-2 puffs every night each nostril              - continue xyzal every night              - to get CT chest              - call if you get a flare              - see me in 4 months     Madison Driver MD  Pulmonary Medicine  [phone]

## 2023-05-11 ENCOUNTER — PATIENT MESSAGE (OUTPATIENT)
Facility: CLINIC | Age: 63
End: 2023-05-11

## 2023-05-11 NOTE — TELEPHONE ENCOUNTER
From: Kj Tsai  To: Sandra Quiros MD  Sent: 5/11/2023 11:06 AM CDT  Subject: Photo of what I am coughing up    This is pic of what I am coughing up. I have no tonsils and these chunks taste and smell terrible. We discussed at my last visit.  Donna Baldwin

## 2023-05-23 ENCOUNTER — TELEPHONE (OUTPATIENT)
Facility: CLINIC | Age: 63
End: 2023-05-23

## 2023-05-23 RX ORDER — FLUTICASONE PROPIONATE AND SALMETEROL 113; 14 UG/1; UG/1
1 POWDER, METERED RESPIRATORY (INHALATION) 2 TIMES DAILY
Qty: 1 EACH | Refills: 5 | Status: SHIPPED | OUTPATIENT
Start: 2023-05-23

## 2023-05-23 NOTE — TELEPHONE ENCOUNTER
Received a fax from [facility]. Dulera not covered. Air Duo, Alvesco, Asmanex or Qvar. Per Dr. Nan Morin, send Rx for Air Duo 113-14 1 puff bid X6. Pt notified via [address]

## 2023-05-23 NOTE — TELEPHONE ENCOUNTER
Pt called to get an update per dr Candie Chu request.  Pt states that she has been coughing \"those particles up for a long time\". I asked if the coughing has been same, better, worse than when she called 05/11/23. Pt stated \"its better than it was. \"  Pt states she will get CT scan by the end of this week. Pt denies fevers/chills or change in what she is coughing up. I offered pt an earlier appt than her august appt so she can discuss results of CT scan and talk further about concerns. She agreed and call transferred to [facility] to schedule appt.

## 2023-05-25 ENCOUNTER — TELEPHONE (OUTPATIENT)
Facility: CLINIC | Age: 63
End: 2023-05-25

## 2023-05-25 NOTE — TELEPHONE ENCOUNTER
Received the following message  stating  \"Patient has been coughing up some stuff. Sent message last week through [address] [place] [address] [address]. Was able to collect some in a plastic bag. Didn't know if Dr. Carolin Romero wanted to see it or not\"    Pt messaged staff last week with picture on 05/11/23 and has CT scheduled 05/27/23. Will route this to Dr. Carolin Romero for review/direction.

## 2023-05-25 NOTE — TELEPHONE ENCOUNTER
Patient has been coughing up some stuff. Sent message last week through [address] [place] [address] [address]. Was able to collect some in a plastic bag.   Didn't know if Dr. Kira Riddle wanted to see it or not

## 2023-05-27 ENCOUNTER — HOSPITAL ENCOUNTER (OUTPATIENT)
Dept: CT IMAGING | Facility: HOSPITAL | Age: 63
Discharge: HOME | End: 2023-05-27
Attending: INTERNAL MEDICINE
Payer: COMMERCIAL

## 2023-05-27 DIAGNOSIS — J47.9 BRONCHIECTASIS WITHOUT COMPLICATION (HCC): ICD-10-CM

## 2023-05-27 PROCEDURE — 71250 CT THORAX DX C-: CPT | Performed by: INTERNAL MEDICINE

## 2023-05-30 ENCOUNTER — OFFICE VISIT (OUTPATIENT)
Facility: CLINIC | Age: 63
End: 2023-05-30
Payer: COMMERCIAL

## 2023-05-30 VITALS
OXYGEN SATURATION: 97 % | HEART RATE: 85 BPM | BODY MASS INDEX: 33.82 KG/M2 | RESPIRATION RATE: 16 BRPM | HEIGHT: 65 IN | SYSTOLIC BLOOD PRESSURE: 122 MMHG | WEIGHT: 203 LBS | DIASTOLIC BLOOD PRESSURE: 80 MMHG

## 2023-05-30 DIAGNOSIS — R91.1 INCIDENTAL PULMONARY NODULE, > 3MM AND < 8MM: Primary | ICD-10-CM

## 2023-05-30 PROCEDURE — 99214 OFFICE O/P EST MOD 30 MIN: CPT | Performed by: INTERNAL MEDICINE

## 2023-05-30 PROCEDURE — 3074F SYST BP LT 130 MM HG: CPT | Performed by: INTERNAL MEDICINE

## 2023-05-30 PROCEDURE — 3079F DIAST BP 80-89 MM HG: CPT | Performed by: INTERNAL MEDICINE

## 2023-05-30 PROCEDURE — 3008F BODY MASS INDEX DOCD: CPT | Performed by: INTERNAL MEDICINE

## 2023-05-30 NOTE — PATIENT INSTRUCTIONS
Plan--  - Resume advair twice a day              - consider taking tagamet daily              - to get overnight oximetry              - to get CT chest in 3 months then see me                              Luke Lopez MD  Pulmonary Medicine  05/30/23

## 2023-06-06 ENCOUNTER — PATIENT MESSAGE (OUTPATIENT)
Facility: CLINIC | Age: 63
End: 2023-06-06

## 2023-06-27 NOTE — TELEPHONE ENCOUNTER
From: Quentin Vance  To: Lorena Best  Sent: 6/6/2023 4:16 PM CDT  Subject: lab work    Lourdes Mccauley,  When you saw Dr Nalini Love 02/16/23 she wanted you to get blood work - in 1-2 months. The orders are in the computer and you can go to any [facility] lab to get them completed. If you have any questions just let our office know.    Dr Roxanne Shaw office

## 2023-07-07 ENCOUNTER — TELEPHONE (OUTPATIENT)
Facility: CLINIC | Age: 63
End: 2023-07-07

## 2023-07-07 DIAGNOSIS — J47.9 BRONCHIECTASIS WITHOUT COMPLICATION (HCC): Primary | ICD-10-CM

## 2023-07-07 NOTE — TELEPHONE ENCOUNTER
Routed \[facility]\""stigen 19 order and last office notes to [address]. Notified Molly Carter of order as well. [facility] sent to pt with Molly information.

## 2023-07-07 NOTE — TELEPHONE ENCOUNTER
Received [facility] from patient stating: \"Dr said I would be receiving something that I would wear at night to see if my oxygen level is dropping. I have not yet received and thought I should follow-up. \"    Called pt to clarify if she uses a DME currently. Pt states she does not use DME at this time. Informed pt that we would be notifying Dorina Mota of order and she should hear from them in next week. If she does not hear from Dorina Mota I told pt to call office or call Dorina Mota and that I would be sending her the phone # of Dorina Mota in a [facility]. Pt verbalized understanding of all. Pt had no questions at this time.

## 2023-07-13 NOTE — ASSESSMENT & PLAN NOTE
Asthma (mild intermittent) is under Excellent control and Good compliance.   Asthma controller Meds: Fluticasone-Salmeterol Aepb - 113-14 MCG/ACT; montelukast Tabs - 10 MG  Asthma rescue Meds:

## 2023-07-13 NOTE — ASSESSMENT & PLAN NOTE
Last K was 4.3 done on 4/24/2023. Last Cr was 0.65 done on 4/24/2023. Last GFR (ROULA) was 89 done on 11/24/2021. Hypertension meds include losartan 50 MG Oral Tab [[id number]].    Stable, continue present management

## 2023-07-13 NOTE — ASSESSMENT & PLAN NOTE
Last A1c value was 6.1% done 4/24/2023. Sugar control is well controlled, no significant medication side effects noted, Pre-Diabetic. Not currently on Diabetic meds.

## 2023-07-13 NOTE — ASSESSMENT & PLAN NOTE
Thyroid shows Good control. TSH: 4.25, done on 8/9/2022. FT4: 1.3, done on 11/24/2021. Thyroid therapy includes levothyroxine 125 MCG Oral Tab [[id number]].

## 2023-07-14 ENCOUNTER — OFFICE VISIT (OUTPATIENT)
Dept: FAMILY MEDICINE CLINIC | Facility: CLINIC | Age: 63
End: 2023-07-14
Payer: COMMERCIAL

## 2023-07-14 ENCOUNTER — TELEPHONE (OUTPATIENT)
Dept: FAMILY MEDICINE CLINIC | Facility: CLINIC | Age: 63
End: 2023-07-14

## 2023-07-14 VITALS
HEIGHT: 65 IN | WEIGHT: 202 LBS | DIASTOLIC BLOOD PRESSURE: 60 MMHG | RESPIRATION RATE: 14 BRPM | HEART RATE: 82 BPM | BODY MASS INDEX: 33.66 KG/M2 | SYSTOLIC BLOOD PRESSURE: 130 MMHG

## 2023-07-14 DIAGNOSIS — J45.31 MILD PERSISTENT ASTHMA WITH ACUTE EXACERBATION: ICD-10-CM

## 2023-07-14 DIAGNOSIS — F33.41 RECURRENT MAJOR DEPRESSIVE DISORDER, IN PARTIAL REMISSION (HCC): ICD-10-CM

## 2023-07-14 DIAGNOSIS — I10 ESSENTIAL HYPERTENSION: ICD-10-CM

## 2023-07-14 DIAGNOSIS — R00.2 PALPITATION: ICD-10-CM

## 2023-07-14 DIAGNOSIS — R73.03 PREDIABETES: Primary | ICD-10-CM

## 2023-07-14 DIAGNOSIS — E55.9 VITAMIN D DEFICIENCY: ICD-10-CM

## 2023-07-14 DIAGNOSIS — J32.9 RECURRENT SINUS INFECTIONS: ICD-10-CM

## 2023-07-14 DIAGNOSIS — E66.9 OBESITY WITH BODY MASS INDEX OF 30.0-39.9: ICD-10-CM

## 2023-07-14 DIAGNOSIS — E03.9 HYPOTHYROIDISM (ACQUIRED): ICD-10-CM

## 2023-07-14 PROCEDURE — 3078F DIAST BP <80 MM HG: CPT | Performed by: FAMILY MEDICINE

## 2023-07-14 PROCEDURE — 3008F BODY MASS INDEX DOCD: CPT | Performed by: FAMILY MEDICINE

## 2023-07-14 PROCEDURE — 3075F SYST BP GE 130 - 139MM HG: CPT | Performed by: FAMILY MEDICINE

## 2023-07-14 PROCEDURE — 99214 OFFICE O/P EST MOD 30 MIN: CPT | Performed by: FAMILY MEDICINE

## 2023-07-14 RX ORDER — CEFUROXIME AXETIL 250 MG/1
250 TABLET ORAL 2 TIMES DAILY
Qty: 20 TABLET | Refills: 0 | Status: SHIPPED | OUTPATIENT
Start: 2023-07-14 | End: 2023-07-24

## 2023-07-14 RX ORDER — SODIUM FLUORIDE 5 MG/G
CREAM DENTAL
COMMUNITY
Start: 2023-06-28

## 2023-07-14 RX ORDER — PREDNISONE 10 MG/1
TABLET ORAL
Qty: 35 TABLET | Refills: 0 | Status: SHIPPED | OUTPATIENT
Start: 2023-07-14 | End: 2023-08-08

## 2023-07-14 RX ORDER — CODEINE PHOSPHATE AND GUAIFENESIN 10; 100 MG/5ML; MG/5ML
5 SOLUTION ORAL 3 TIMES DAILY PRN
Qty: 180 ML | Refills: 0 | Status: SHIPPED | OUTPATIENT
Start: 2023-07-14

## 2023-07-14 NOTE — TELEPHONE ENCOUNTER
Please enter lab orders for the patient's upcoming physical appointment. Physical scheduled: Your appointments       Date & Time Appointment Department [facility])    Reagan 15, 2024  6:00 PM CST Physical - Established with Simone Sharp MD [address], [address] [address] [place] [address], [place] ([facility])              [facility] [address] [facility] [address] [phone]           Preferred lab: [facility]     The patient has been notified to complete fasting labs prior to their physical appointment.

## 2023-07-25 ENCOUNTER — TELEPHONE (OUTPATIENT)
Dept: FAMILY MEDICINE CLINIC | Facility: CLINIC | Age: 63
End: 2023-07-25

## 2023-07-25 DIAGNOSIS — Z12.31 SCREENING MAMMOGRAM FOR BREAST CANCER: Primary | ICD-10-CM

## 2023-08-01 ENCOUNTER — TELEPHONE (OUTPATIENT)
Dept: FAMILY MEDICINE CLINIC | Facility: CLINIC | Age: 63
End: 2023-08-01

## 2023-08-01 NOTE — TELEPHONE ENCOUNTER
Pt is calling because she was typing on her phone and her eyes crossed and she was unable to see for a few seconds and wants to know if she needs to do anything

## 2023-08-11 ENCOUNTER — HOSPITAL ENCOUNTER (OUTPATIENT)
Dept: CT IMAGING | Facility: HOSPITAL | Age: 63
Discharge: HOME | End: 2023-08-11
Attending: INTERNAL MEDICINE
Payer: COMMERCIAL

## 2023-08-11 DIAGNOSIS — R91.1 INCIDENTAL PULMONARY NODULE, > 3MM AND < 8MM: ICD-10-CM

## 2023-08-11 PROCEDURE — 71250 CT THORAX DX C-: CPT | Performed by: INTERNAL MEDICINE

## 2023-08-14 ENCOUNTER — TELEPHONE (OUTPATIENT)
Dept: FAMILY MEDICINE CLINIC | Facility: CLINIC | Age: 63
End: 2023-08-14

## 2023-08-14 DIAGNOSIS — R49.0 HOARSENESS OF VOICE: Primary | ICD-10-CM

## 2023-08-14 NOTE — TELEPHONE ENCOUNTER
Pt is calling she has had a very hoarse voice for 2-3 weeks. Got medication for her cough but her voice is still hoarse and feels she has a lump in her throat. She said people are noticing the change in her voice.

## 2023-08-15 NOTE — TELEPHONE ENCOUNTER
Called and talked to patient the cough is gone and she feels better but her voice is hoarse and she feels she still has the lump despite the prednisone. Will ask Dr Esme Sinclair what her next step is?

## 2023-08-25 ENCOUNTER — OFFICE VISIT (OUTPATIENT)
Facility: CLINIC | Age: 63
End: 2023-08-25
Payer: COMMERCIAL

## 2023-08-25 VITALS
BODY MASS INDEX: 33.49 KG/M2 | WEIGHT: 201 LBS | DIASTOLIC BLOOD PRESSURE: 78 MMHG | HEART RATE: 79 BPM | HEIGHT: 65 IN | OXYGEN SATURATION: 99 % | SYSTOLIC BLOOD PRESSURE: 132 MMHG | RESPIRATION RATE: 16 BRPM

## 2023-08-25 DIAGNOSIS — G47.33 OSA (OBSTRUCTIVE SLEEP APNEA): ICD-10-CM

## 2023-08-25 DIAGNOSIS — R91.1 PULMONARY NODULE: Primary | ICD-10-CM

## 2023-08-25 PROCEDURE — 99214 OFFICE O/P EST MOD 30 MIN: CPT | Performed by: INTERNAL MEDICINE

## 2023-08-25 NOTE — PATIENT INSTRUCTIONS
Plan--  - Resume airduo twice a day - rinse and spit                - resume flonase 1-2 puffs at night              -  taking tagamet daily              - to get sleep study              - to get CT chest in 4 months then see me               - Blood work    in 2-3 months - not now                         Luke Lopez MD  Pulmonary Medicine  [phone]

## 2023-11-07 ENCOUNTER — MED REC SCAN ONLY (OUTPATIENT)
Facility: CLINIC | Age: 63
End: 2023-11-07

## 2023-11-22 ENCOUNTER — TELEPHONE (OUTPATIENT)
Facility: CLINIC | Age: 63
End: 2023-11-22

## 2023-11-22 NOTE — TELEPHONE ENCOUNTER
Cbc and immunoglobulin lab requisitions faxed to [phone]. Confirmation received. Pt called, no answer. [facility] sent with the info above.

## 2023-11-27 NOTE — TELEPHONE ENCOUNTER
Received Lab results for CBC from [employer].   Placed fax in dr Day Fernandez in basket for review and routing message to Dr Rosalia Gonzalez as well.

## 2023-12-15 ENCOUNTER — HOSPITAL ENCOUNTER (OUTPATIENT)
Dept: MAMMOGRAPHY | Facility: HOSPITAL | Age: 63
Discharge: HOME | End: 2023-12-15
Attending: FAMILY MEDICINE
Payer: COMMERCIAL

## 2023-12-15 ENCOUNTER — HOSPITAL ENCOUNTER (OUTPATIENT)
Dept: CT IMAGING | Facility: HOSPITAL | Age: 63
Discharge: HOME | End: 2023-12-15
Attending: INTERNAL MEDICINE
Payer: COMMERCIAL

## 2023-12-15 DIAGNOSIS — Z12.31 SCREENING MAMMOGRAM FOR BREAST CANCER: ICD-10-CM

## 2023-12-15 DIAGNOSIS — R91.1 PULMONARY NODULE: ICD-10-CM

## 2023-12-15 PROCEDURE — 77063 BREAST TOMOSYNTHESIS BI: CPT | Performed by: FAMILY MEDICINE

## 2023-12-15 PROCEDURE — 77067 SCR MAMMO BI INCL CAD: CPT | Performed by: FAMILY MEDICINE

## 2023-12-15 PROCEDURE — 71250 CT THORAX DX C-: CPT | Performed by: INTERNAL MEDICINE

## 2023-12-28 ENCOUNTER — OFFICE VISIT (OUTPATIENT)
Facility: CLINIC | Age: 63
End: 2023-12-28
Payer: COMMERCIAL

## 2023-12-28 VITALS
OXYGEN SATURATION: 97 % | RESPIRATION RATE: 16 BRPM | SYSTOLIC BLOOD PRESSURE: 122 MMHG | BODY MASS INDEX: 33.82 KG/M2 | DIASTOLIC BLOOD PRESSURE: 78 MMHG | WEIGHT: 203 LBS | HEIGHT: 65 IN | HEART RATE: 84 BPM

## 2023-12-28 DIAGNOSIS — J45.30 MILD PERSISTENT ASTHMA WITHOUT COMPLICATION: ICD-10-CM

## 2023-12-28 DIAGNOSIS — G47.33 OSA (OBSTRUCTIVE SLEEP APNEA): ICD-10-CM

## 2023-12-28 DIAGNOSIS — R05.3 CHRONIC COUGH: Primary | ICD-10-CM

## 2023-12-28 DIAGNOSIS — R91.1 PULMONARY NODULE, RIGHT: ICD-10-CM

## 2023-12-28 PROCEDURE — 3078F DIAST BP <80 MM HG: CPT | Performed by: INTERNAL MEDICINE

## 2023-12-28 PROCEDURE — 3008F BODY MASS INDEX DOCD: CPT | Performed by: INTERNAL MEDICINE

## 2023-12-28 PROCEDURE — 3074F SYST BP LT 130 MM HG: CPT | Performed by: INTERNAL MEDICINE

## 2023-12-28 PROCEDURE — 99214 OFFICE O/P EST MOD 30 MIN: CPT | Performed by: INTERNAL MEDICINE

## 2023-12-28 RX ORDER — ALBUTEROL SULFATE 90 UG/1
2 AEROSOL, METERED RESPIRATORY (INHALATION) EVERY 6 HOURS PRN
Qty: 3 EACH | Refills: 0 | Status: SHIPPED | OUTPATIENT
Start: 2023-12-28 | End: 2024-03-27

## 2023-12-28 NOTE — PATIENT INSTRUCTIONS
Plan--  - consider  airduo twice a day - rinse and spit                - resume flonase 1-2 puffs at night- with congestion              -  consider omeprazole 20mg - take 2 at night until controlled then one  a night               - we will call with overnight              - to get CT chest in 6 months               - see me  after [facility], MD  Pulmonary Medicine  [phone]

## 2024-01-08 ENCOUNTER — TELEPHONE (OUTPATIENT)
Dept: FAMILY MEDICINE CLINIC | Facility: CLINIC | Age: 64
End: 2024-01-08

## 2024-01-08 DIAGNOSIS — Z13.6 SCREENING FOR CARDIOVASCULAR CONDITION: ICD-10-CM

## 2024-01-08 DIAGNOSIS — Z13.0 SCREENING FOR IRON DEFICIENCY ANEMIA: ICD-10-CM

## 2024-01-08 DIAGNOSIS — Z13.29 SCREENING FOR THYROID DISORDER: ICD-10-CM

## 2024-01-08 DIAGNOSIS — R73.03 PREDIABETES: ICD-10-CM

## 2024-01-08 DIAGNOSIS — Z13.1 SCREENING FOR DIABETES MELLITUS: Primary | ICD-10-CM

## 2024-01-08 DIAGNOSIS — I10 ESSENTIAL HYPERTENSION: ICD-10-CM

## 2024-01-08 DIAGNOSIS — E78.5 DYSLIPIDEMIA: ICD-10-CM

## 2024-01-08 DIAGNOSIS — Z00.00 LABORATORY EXAMINATION ORDERED AS PART OF A ROUTINE GENERAL MEDICAL EXAMINATION: ICD-10-CM

## 2024-01-08 DIAGNOSIS — E07.9 THYROID DISORDER: ICD-10-CM

## 2024-01-08 DIAGNOSIS — Z79.899 LONG-TERM USE OF HIGH-RISK MEDICATION: ICD-10-CM

## 2024-01-08 NOTE — TELEPHONE ENCOUNTER
Please enter lab orders for the patient's upcoming physical appointment.     Physical scheduled:   Your appointments       Date & Time Appointment Department ([facility])    Jan 17, 2024  8:30 AM CST Physical - Established with Chico Olivo MD [facility] ([facility]              [facility]  [address]  [facility] [id number] [phone]           Preferred lab: [facility]     The patient has been notified to complete fasting labs prior to their physical appointment.

## 2024-01-09 NOTE — TELEPHONE ENCOUNTER
1. Screening for diabetes mellitus (Primary)  -     Comp Metabolic Panel (14)  -     Hemoglobin A1C  2. Prediabetes  Overview:  A1c 6.0, endocrinologist started metformin 500 BID but no longer on meds with A1c 4.6% 4/2018, Dx 2013 with Dr Yi  Orders:  -     Hemoglobin A1C  3. Screening for iron deficiency anemia  -     CBC With Differential With Platelet  4. Screening for thyroid disorder  -     TSH W Reflex To Free T4  5. Screening for cardiovascular condition  -     Lipid Panel  6. Long-term use of high-risk medication  -     CBC With Differential With Platelet  7. Laboratory examination ordered as part of a routine general medical examination  -     TSH W Reflex To Free T4  -     Lipid Panel  -     Comp Metabolic Panel (14)  -     CBC With Differential With Platelet  -     Hemoglobin A1C  8. Dyslipidemia  -     TSH W Reflex To Free T4  -     Lipid Panel  9. Thyroid disorder  -     TSH W Reflex To Free T4  10. Essential hypertension  Overview:  losartan 50  Orders:  -     TSH W Reflex To Free T4  -     Comp Metabolic Panel (14)       OK to notify. Thanks, Marcus Olivo MD

## 2024-01-15 NOTE — ASSESSMENT & PLAN NOTE
Thyroid shows Good control.   TSH: 4.25, done on 8/9/2022.  FT4: 1.3, done on 11/24/2021.   Thyroid therapy includes levothyroxine 125 MCG Oral Tab [[id number]], levothyroxine 125 MCG Oral Tab [[id number]] (Long-Term Med).

## 2024-01-15 NOTE — ASSESSMENT & PLAN NOTE
BP shows good control with last BP of 122/78. Continue lifestyle changes, diet, exercise and weight loss.   Last K was 4.3 done on 4/24/2023.  Last Cr was 0.65 done on 4/24/2023.  Last eGFR was 101 on 4/24/2023.  BP Meds: losartan Tabs - 50 MG

## 2024-01-17 ENCOUNTER — OFFICE VISIT (OUTPATIENT)
Dept: FAMILY MEDICINE CLINIC | Facility: CLINIC | Age: 64
End: 2024-01-17
Payer: COMMERCIAL

## 2024-01-17 VITALS
DIASTOLIC BLOOD PRESSURE: 80 MMHG | RESPIRATION RATE: 12 BRPM | HEIGHT: 65 IN | WEIGHT: 198.19 LBS | SYSTOLIC BLOOD PRESSURE: 126 MMHG | HEART RATE: 70 BPM | BODY MASS INDEX: 33.02 KG/M2

## 2024-01-17 DIAGNOSIS — I10 ESSENTIAL HYPERTENSION: ICD-10-CM

## 2024-01-17 DIAGNOSIS — B02.9 HERPES ZOSTER WITHOUT COMPLICATION: ICD-10-CM

## 2024-01-17 DIAGNOSIS — M77.11 RIGHT TENNIS ELBOW: ICD-10-CM

## 2024-01-17 DIAGNOSIS — J45.31 MILD PERSISTENT ASTHMA WITH ACUTE EXACERBATION: ICD-10-CM

## 2024-01-17 DIAGNOSIS — E55.9 VITAMIN D DEFICIENCY: ICD-10-CM

## 2024-01-17 DIAGNOSIS — Z12.4 SCREENING FOR CERVICAL CANCER: ICD-10-CM

## 2024-01-17 DIAGNOSIS — F33.41 RECURRENT MAJOR DEPRESSIVE DISORDER, IN PARTIAL REMISSION (HCC): ICD-10-CM

## 2024-01-17 DIAGNOSIS — E03.9 HYPOTHYROIDISM (ACQUIRED): ICD-10-CM

## 2024-01-17 DIAGNOSIS — R73.03 PREDIABETES: ICD-10-CM

## 2024-01-17 DIAGNOSIS — Z00.00 ANNUAL PHYSICAL EXAM: Primary | ICD-10-CM

## 2024-01-17 PROCEDURE — 3074F SYST BP LT 130 MM HG: CPT | Performed by: FAMILY MEDICINE

## 2024-01-17 PROCEDURE — 87624 HPV HI-RISK TYP POOLED RSLT: CPT | Performed by: FAMILY MEDICINE

## 2024-01-17 PROCEDURE — 99396 PREV VISIT EST AGE 40-64: CPT | Performed by: FAMILY MEDICINE

## 2024-01-17 PROCEDURE — 3079F DIAST BP 80-89 MM HG: CPT | Performed by: FAMILY MEDICINE

## 2024-01-17 PROCEDURE — 99213 OFFICE O/P EST LOW 20 MIN: CPT | Performed by: FAMILY MEDICINE

## 2024-01-17 PROCEDURE — 3008F BODY MASS INDEX DOCD: CPT | Performed by: FAMILY MEDICINE

## 2024-01-17 PROCEDURE — 88175 CYTOPATH C/V AUTO FLUID REDO: CPT | Performed by: FAMILY MEDICINE

## 2024-01-17 RX ORDER — LOSARTAN POTASSIUM 50 MG/1
50 TABLET ORAL DAILY
Qty: 90 TABLET | Refills: 3 | Status: SHIPPED | OUTPATIENT
Start: 2024-01-17

## 2024-01-17 RX ORDER — LEVOTHYROXINE SODIUM 0.12 MG/1
125 TABLET ORAL DAILY
Qty: 90 TABLET | Refills: 3 | Status: SHIPPED | OUTPATIENT
Start: 2024-01-17

## 2024-01-17 NOTE — PROGRESS NOTES
Kaylynn Plummer is a 63 year old female who presents for a complete physical exam.     had concerns including Physical (WWE, with pap /).   No topic due editable text      Subjective:     Symptoms: is menopausal. Patient complains of doing well. Pap today, has a new pop and doing a lot of dog shows.   Stable breathing.   Right elbow pain, swelling into right hand, .   Tobacco:  She has never smoked tobacco.       Wt Readings from Last 4 Encounters:   24 198 lb 3.2 oz (89.9 kg)   23 203 lb (92.1 kg)   23 201 lb (91.2 kg)   23 202 lb (91.6 kg)     Body mass index is 32.98 kg/m².     The 10-year ASCVD risk score (Kuldeep LOGAN, et al., 2019) is: 4.4%    Values used to calculate the score:      Age: 63 years      Sex: Female      Is Non- : No      Diabetic: No      Tobacco smoker: No      Systolic Blood Pressure: 126 mmHg      Is BP treated: Yes      HDL Cholesterol: 53 mg/dL      Total Cholesterol: 175 mg/dL    Chief Complaint Reviewed and Verified  Nursing Notes Reviewed and   Verified  Tobacco Reviewed  Allergies Reviewed  Medications Reviewed    Problem List Reviewed  Medical History Reviewed  Surgical History   Reviewed  OB Status Reviewed  Family History Reviewed          Her family history includes Arthritis in her mother; Cancer in her father and mother; Other in her father, mother, mother, sister, sister, and another family member; tobbaco use in her father.   She  reports that she has never smoked. She has never used smokeless tobacco. She reports current alcohol use. She reports that she does not use drugs.    Exercise: three times per week, walking.  Diet: watches minimally    GYNE HISTORY: Menstrual History:  OB History    Para Term  AB Living   0 0 0 0 0 0   SAB IAB Ectopic Multiple Live Births   0 0 0 0 0      Menarche age:    Patient's last menstrual period was 2015 (exact date).       Health Maintenance Due   Topic Date Due     Asthma Action Plan  Never done    Pneumococcal Vaccine: Birth to 64yrs (1 of 2 - PCV) Never done    Pap Smear  09/07/2021    COVID-19 Vaccine (3 - 2023-24 season) 09/01/2023    Influenza Vaccine (1) 10/01/2023    Annual Depression Screening  01/01/2024    Annual Physical  01/16/2024         Review of Systems   Constitutional: Negative.  Negative for activity change, appetite change, chills and fever.   HENT: Negative.     Eyes: Negative.    Respiratory: Negative.  Negative for shortness of breath.    Cardiovascular: Negative.  Negative for chest pain and palpitations.   Gastrointestinal: Negative.  Negative for abdominal pain.   Genitourinary: Negative.  Negative for dysuria.   Musculoskeletal:  Negative for arthralgias.   Skin: Negative.  Negative for rash.   Allergic/Immunologic: Negative.    Neurological: Negative.         Results:    Lab Results   Component Value Date/Time    WBC 5.4 04/24/2023 08:31 AM    HGB 13.7 04/24/2023 08:31 AM     04/24/2023 08:31 AM      Lab Results   Component Value Date/Time     (H) 04/24/2023 08:31 AM     04/24/2023 08:31 AM    K 4.3 04/24/2023 08:31 AM     04/24/2023 08:31 AM    CO2 26 04/24/2023 08:31 AM    CREATSERUM 0.65 04/24/2023 08:31 AM    CA 8.9 04/24/2023 08:31 AM    ALB 3.9 04/24/2023 08:31 AM    TP 6.4 04/24/2023 08:31 AM    ALKPHO 84 04/24/2023 08:31 AM    AST 15 04/24/2023 08:31 AM    ALT 16 04/24/2023 08:31 AM    BILT 0.6 04/24/2023 08:31 AM    TSH 1.31 11/24/2021 07:45 AM    T4F 1.3 11/24/2021 07:45 AM        Lab Results   Component Value Date/Time    CHOLEST 175 04/24/2023 08:31 AM    HDL 53 04/24/2023 08:31 AM    TRIG 111 04/24/2023 08:31 AM     (H) 04/24/2023 08:31 AM    NONHDLC 122 04/24/2023 08:31 AM       Last A1c value was 6.1% done 4/24/2023.     Vitamin D:     Lab Results   Component Value Date    VITD 51 04/24/2023          Objective:    EXAM:  /80   Pulse 70   Resp 12   Ht 5' 5\" (1.651 m)   Wt 198 lb 3.2 oz  (89.9 kg)   LMP 07/27/2015 (Exact Date)   BMI 32.98 kg/m²  Estimated body mass index is 32.98 kg/m² as calculated from the following:    Height as of this encounter: 5' 5\" (1.651 m).    Weight as of this encounter: 198 lb 3.2 oz (89.9 kg).   Physical Exam  Vitals and nursing note reviewed. Exam conducted with a chaperone present.   Constitutional:       General: She is not in acute distress.     Appearance: Normal appearance.   HENT:      Head: Normocephalic and atraumatic.      Right Ear: Tympanic membrane and external ear normal.      Left Ear: Tympanic membrane and external ear normal.      Nose: Nose normal.      Mouth/Throat:      Mouth: Mucous membranes are moist.   Eyes:      Extraocular Movements: Extraocular movements intact.      Pupils: Pupils are equal, round, and reactive to light.   Cardiovascular:      Rate and Rhythm: Normal rate and regular rhythm.      Pulses: Normal pulses.           Carotid pulses are 2+ on the right side and 2+ on the left side.       Radial pulses are 2+ on the right side and 2+ on the left side.        Dorsalis pedis pulses are 2+ on the right side and 2+ on the left side.        Posterior tibial pulses are 2+ on the right side and 2+ on the left side.      Heart sounds: Normal heart sounds, S1 normal and S2 normal. No murmur heard.  Pulmonary:      Effort: Pulmonary effort is normal.      Breath sounds: Normal breath sounds.   Chest:      Chest wall: No mass.   Breasts:     Breasts are symmetrical.      Right: No inverted nipple or tenderness.      Left: No inverted nipple or tenderness.   Abdominal:      General: Abdomen is flat. Bowel sounds are normal. There is no distension.      Palpations: Abdomen is soft.   Genitourinary:     General: Normal vulva.      Exam position: Prone.      Labia:         Right: No rash.         Left: No rash.       Vagina: Normal. No vaginal discharge.      Cervix: No cervical motion tenderness, discharge or friability.      Adnexa:          Right: No mass.          Left: No mass.        Rectum: Normal.      Comments: Mild minimal prolapse with cough  Musculoskeletal:         General: Normal range of motion.      Cervical back: Normal range of motion and neck supple.      Right lower leg: No edema.      Left lower leg: No edema.   Skin:     General: Skin is warm and dry.      Capillary Refill: Capillary refill takes less than 2 seconds.   Neurological:      General: No focal deficit present.      Mental Status: She is alert and oriented to person, place, and time.   Psychiatric:         Mood and Affect: Mood normal.         Behavior: Behavior normal.         Thought Content: Thought content normal.      2x3 area of shinlges right cluteal cleft, not beyond midline    Assessment & Plan:    Kaylynn Plummer is a 63 year old female who presents for a complete physical exam.   Pt's weight is Body mass index is 32.98 kg/m²., recommended low fat diet and aerobic exercise 30 minutes three times weekly.   Health maintenance, Up to date    Immunizations: Up to date   Immunization History   Administered Date(s) Administered    >=3 YRS FLUZONE OR FLUARIX QUAD PRESERVE FREE SINGLE DOSE (13693) FLU CLINIC 09/07/2016    Betamethason Acet&sod Phosp 06/13/2015    Covid-19 Vaccine Novast (J&J) 0.5ml 04/03/2021    Covid-19 Vaccine Moderna 100 mcg/0.5 ml 12/22/2021    FLULAVAL 6 months & older 0.5 ml Prefilled syringe (57093) 11/07/2018, 10/28/2019, 09/18/2020, 11/22/2021    FLUZONE 6 months and older PFS 0.5 ml (15953) 10/28/2019, 09/18/2020    Influenza 10/07/2014, 10/01/2017    TDAP 10/22/2014    Zoster Vaccine Recombinant Adjuvanted (Shingrix) 09/18/2020, 11/27/2020         Pt info given for: exercise, low fat diet, The patient indicates understanding of these issues and agrees to the plan.  The patient is asked to return for CPX in 1 years.    Assessment:  1. Annual physical exam (Primary)  2. Essential hypertension  Overview:  losartan 50  Assessment & Plan:  BP  shows good control with last BP of 122/78. Continue lifestyle changes, diet, exercise and weight loss.   Last K was 4.3 done on 4/24/2023.  Last Cr was 0.65 done on 4/24/2023.  Last eGFR was 101 on 4/24/2023.  BP Meds: losartan Tabs - 50 MG    Orders:  -     Losartan Potassium; Take 1 tablet (50 mg total) by mouth daily.  Dispense: 90 tablet; Refill: 3  3. Recurrent major depressive disorder, in partial remission (HCC)  Overview:  Cymbalta 60 stopped 7/2018, CBT as well  Assessment & Plan:  Clinical Course: stable   Good control  Not currently on Behavioral health meds.   Orders:  -     Levothyroxine Sodium; Take 1 tablet (125 mcg total) by mouth daily.  Dispense: 90 tablet; Refill: 3  4. Mild persistent asthma with acute exacerbation  Overview:  Albuterol HFA, flovent, breo for break thru  Assessment & Plan:  Asthma (mild intermittent) is under Excellent control and Good compliance.  Asthma controller Meds: Fluticasone-Salmeterol Aepb - 113-14 MCG/ACT; montelukast Tabs - 10 MG  Asthma rescue Meds:    5. Hypothyroidism (acquired)  Overview:  Levothyroxine 112  Assessment & Plan:  Thyroid shows Good control.   TSH: 4.25, done on 8/9/2022.  FT4: 1.3, done on 11/24/2021.   Thyroid therapy includes levothyroxine 125 MCG Oral Tab [[id number]], levothyroxine 125 MCG Oral Tab [[id number]] (Long-Term Med).   6. Prediabetes  Overview:  A1c 6.0, endocrinologist started metformin 500 BID but no longer on meds with A1c 4.6% 4/2018, Dx 2013 with Dr Yi  Assessment & Plan:  Last A1c value was 6.1% done 4/24/2023.    Sugar control is well controlled, no significant medication side effects noted, Pre-Diabetic.  Not currently on Diabetic meds.        7. Vitamin D deficiency  Overview:  Dx in 2012    Assessment & Plan:  4/24/2023: VITAMIN D, 25-OH, TOTAL 51 stable, continue present management   8. Screening for cervical cancer  -     ThinPrep PAP Smear B; Future; Expected date: 01/17/2024  -     Hpv High Risk , Thin Prep Collect;  Future; Expected date: 01/17/2024  -     ThinPrep PAP Smear B  -     Hpv High Risk , Thin Prep Collect  9. Right tennis elbow  10. Herpes zoster without complication     [facility]ussed Home Exercise Program for elbow and shinklges care with topical meds if needed.     I have discontinued Kaylynn Plummer's Azelastine-Fluticasone and montelukast. I am also having her maintain her OptiChamber Kami, clobetasol, ergocalciferol, Fluticasone-Salmeterol, Sodium Fluoride 5000 Plus, guaiFENesin-codeine, albuterol, levothyroxine, and losartan.     Return in about 6 months (around 7/17/2024) for recheck.

## 2024-01-18 LAB — HPV I/H RISK 1 DNA SPEC QL NAA+PROBE: NEGATIVE

## 2024-01-22 LAB
.: NORMAL
.: NORMAL

## 2024-05-17 DIAGNOSIS — E55.9 VITAMIN D DEFICIENCY: ICD-10-CM

## 2024-05-28 RX ORDER — ERGOCALCIFEROL 1.25 MG/1
CAPSULE ORAL
Qty: 12 CAPSULE | Refills: 3 | Status: SHIPPED | OUTPATIENT
Start: 2024-05-28 | End: 2024-05-30

## 2024-05-28 NOTE — TELEPHONE ENCOUNTER
Refill request for:    Requested Prescriptions     Pending Prescriptions Disp Refills    ergocalciferol 1.25 MG (65234 UT) Oral Cap [Pharmacy Med Name: VITAMIN D2 50,000IU (ERGO) CAP RX] 12 capsule 3     Sig: TAKE BY MOUTH AS DIRECTED        Last Prescribed Quantity Refills   1/16/2023 12 3     LOV 1/17/2024     Patient was asked to follow-up in: 6 months    Appointment due: July 2024    Appointment scheduled: 7/19/2024 Chico Olivo MD    Medication not on protocol.     # 12 with 3 refills routed to Chico Olivo MD for review

## 2024-05-30 ENCOUNTER — TELEPHONE (OUTPATIENT)
Dept: FAMILY MEDICINE CLINIC | Facility: CLINIC | Age: 64
End: 2024-05-30

## 2024-05-30 DIAGNOSIS — M25.552 BILATERAL HIP PAIN: ICD-10-CM

## 2024-05-30 DIAGNOSIS — M25.551 BILATERAL HIP PAIN: ICD-10-CM

## 2024-05-30 DIAGNOSIS — M25.551 RIGHT HIP PAIN: Primary | ICD-10-CM

## 2024-05-30 DIAGNOSIS — E55.9 VITAMIN D DEFICIENCY: ICD-10-CM

## 2024-05-30 RX ORDER — ERGOCALCIFEROL 1.25 MG/1
CAPSULE ORAL
Qty: 12 CAPSULE | Refills: 3 | Status: SHIPPED | OUTPATIENT
Start: 2024-05-30

## 2024-05-30 NOTE — TELEPHONE ENCOUNTER
Last February Kaylynn felt a pop in the R hip while at a dog show in February. The hip was painful. She thought it may be a pulled muscle. She rested it and it got better.Now the R hip continues to flare up for the past 6 weeks. The hip is painful when sitting, feeling like it is spasming. 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 She has pain in that area also. She is trying to walk to get her muscles stronger. She is concerned she may have torn something.     She is asking if she should come for an appointment or would a referral to ortho be appropriate.  Routed to

## 2024-05-30 NOTE — TELEPHONE ENCOUNTER
Patient is having a lot of pain in both hips. 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 Please call

## 2024-05-31 NOTE — TELEPHONE ENCOUNTER
We can see her, or she can see PA at [facility].   Dr Dhaval Daniel  [facility] [place]  [phone] . Than ks

## 2024-06-05 ENCOUNTER — TELEPHONE (OUTPATIENT)
Dept: ORTHOPEDICS CLINIC | Facility: CLINIC | Age: 64
End: 2024-06-05

## 2024-06-05 DIAGNOSIS — M25.552 BILATERAL HIP PAIN: Primary | ICD-10-CM

## 2024-06-05 DIAGNOSIS — M25.551 BILATERAL HIP PAIN: Primary | ICD-10-CM

## 2024-06-05 NOTE — TELEPHONE ENCOUNTER
Future Appointments   Date Time Provider Department Center   6/14/2024  1:30 PM [facility] CT MAIN [facility]   6/21/2024 12:30 PM Bridgett Helton MD [facility]m [facility]   7/3/2024  3:40 PM Galindo Jolly PA-C [facility]   7/12/2024  1:00 PM James Bolivar MD [facility]   7/19/2024  1:30 PM Chico Olivo MD [facility] [address] [facility]     Please advise if patient needs aliya hip xrays.

## 2024-06-14 ENCOUNTER — HOSPITAL ENCOUNTER (OUTPATIENT)
Dept: CT IMAGING | Facility: HOSPITAL | Age: 64
Discharge: HOME | End: 2024-06-14
Attending: INTERNAL MEDICINE
Payer: COMMERCIAL

## 2024-06-14 DIAGNOSIS — R91.1 PULMONARY NODULE, RIGHT: ICD-10-CM

## 2024-06-14 PROCEDURE — 71250 CT THORAX DX C-: CPT | Performed by: INTERNAL MEDICINE

## 2024-06-19 ENCOUNTER — OFFICE VISIT (OUTPATIENT)
Facility: CLINIC | Age: 64
End: 2024-06-19

## 2024-06-19 VITALS
HEART RATE: 79 BPM | OXYGEN SATURATION: 97 % | BODY MASS INDEX: 33 KG/M2 | DIASTOLIC BLOOD PRESSURE: 64 MMHG | SYSTOLIC BLOOD PRESSURE: 118 MMHG | WEIGHT: 198.38 LBS

## 2024-06-19 DIAGNOSIS — R91.1 PULMONARY NODULE, RIGHT: ICD-10-CM

## 2024-06-19 DIAGNOSIS — G47.33 OSA (OBSTRUCTIVE SLEEP APNEA): Primary | ICD-10-CM

## 2024-06-19 DIAGNOSIS — J45.30 MILD PERSISTENT ASTHMA WITHOUT COMPLICATION (HCC): ICD-10-CM

## 2024-06-19 PROCEDURE — 99214 OFFICE O/P EST MOD 30 MIN: CPT | Performed by: INTERNAL MEDICINE

## 2024-06-19 RX ORDER — ALBUTEROL SULFATE 90 UG/1
2 AEROSOL, METERED RESPIRATORY (INHALATION) EVERY 4 HOURS PRN
Qty: 3 EACH | Refills: 3 | Status: SHIPPED | OUTPATIENT
Start: 2024-06-19

## 2024-06-19 NOTE — PATIENT INSTRUCTIONS
Plan--  - consider  advair every morning - plan to increase to twice a day if you get sick - or on a bad day                - resume flonase 1-2 puffs at night- with congestion              -  continue omeprazole- every night                - to get home sleep study              -               - see me  in 6 months                 Bridgett Helton MD  Pulmonary Medicine  06/19/24

## 2024-07-03 ENCOUNTER — OFFICE VISIT (OUTPATIENT)
Dept: ORTHOPEDICS CLINIC | Facility: CLINIC | Age: 64
End: 2024-07-03
Payer: COMMERCIAL

## 2024-07-03 ENCOUNTER — HOSPITAL ENCOUNTER (OUTPATIENT)
Dept: GENERAL RADIOLOGY | Age: 64
Discharge: HOME | End: 2024-07-03
Attending: PHYSICIAN ASSISTANT
Payer: COMMERCIAL

## 2024-07-03 VITALS — WEIGHT: 198.38 LBS | BODY MASS INDEX: 33.05 KG/M2 | HEIGHT: 65 IN

## 2024-07-03 DIAGNOSIS — M25.552 BILATERAL HIP PAIN: ICD-10-CM

## 2024-07-03 DIAGNOSIS — M25.551 BILATERAL HIP PAIN: Primary | ICD-10-CM

## 2024-07-03 DIAGNOSIS — R22.2 PALPABLE MASS OF LOWER BACK: ICD-10-CM

## 2024-07-03 DIAGNOSIS — M25.551 BILATERAL HIP PAIN: ICD-10-CM

## 2024-07-03 DIAGNOSIS — M76.891 HAMSTRING TENDINITIS OF RIGHT THIGH: ICD-10-CM

## 2024-07-03 DIAGNOSIS — M25.552 BILATERAL HIP PAIN: Primary | ICD-10-CM

## 2024-07-03 PROCEDURE — 99214 OFFICE O/P EST MOD 30 MIN: CPT | Performed by: PHYSICIAN ASSISTANT

## 2024-07-03 PROCEDURE — 73523 X-RAY EXAM HIPS BI 5/> VIEWS: CPT | Performed by: PHYSICIAN ASSISTANT

## 2024-07-03 NOTE — H&P
[facility] New Patient Note    CC:   Chief Complaint   Patient presents with    Hip Pain     Bilateral hip pain;  Right is worse-feels like sitting on a hard bump, has a knot on left side that is growing   ONSET:  last February, improved then returned 6 weeks ago;   Worse with stairs;  Pain Score: 1-2, @ peak 6-7 ;  *s/N took 10mg prednisone last night due to lung issue       HPI: This 64 year old female presents today with complaints of 2 separate issues, right buttock lump/pain and left low back palpable lump with radiating pain to the left buttock.  She reports first noticing the lump along the left-sided low back last year and initially described it as marble sized.  Dr. Escalante initially evaluated with an ultrasound but it was unable to be identified.  Patient reports she feels like it has gotten bigger and any palpation along this area since her radiating pain to the left buttock and lateral hip.  Regards to the right side, the patient reports feeling like she sitting on a hard lump along the buttock region that worsens on and off with radiation to the right posterior thigh.  She feels tightness in the right calf and posterior thigh.  She takes Advil very sparingly for pain control and generally does not like to take NSAIDs as she had a severe case of bleeding from aspirin.  No prior injections or surgeries for the low back or hips.  She did undergo physical therapy for the right knee but nothing for the low back of the hips.    Past Medical History:    Abdominal hernia    Allergic rhinitis    Allergy, unspecified not elsewhere classified    Asthma (HCC)    Atypical mole    removed    Back pain    Bloating    Borderline diabetes    Dx in 2013    Chronic cough    Depressive disorder    Fatigue    Flatulence/gas pain/belching    GERD (gastroesophageal reflux disease)    worst at night    Heartburn    Hemorrhoids    Herpes simplex without mention of complication    High cholesterol    History of depression     several years ago    History of general anesthesia complication    vomiting    Hypothyroidism    Dx in 2015    Night sweats    Pain in joints    Pain with bowel movements    Shortness of breath    Skin blushing/flushing    Sputum production    Stool incontinence    Stress    Ulcer of esophagus with bleeding    Vitamin D deficiency    Dx in 2012    Wears glasses    Weight gain     Past Surgical History:   Procedure Laterality Date    [facility]  [facility]    Colonoscopy      Egd  08/25/2017    [facility]    Other surgical history      Dental surgeries    Sigmoidoscopy,diagnostic      years ago    Tonsillectomy      In childhood     Current Outpatient Medications   Medication Sig Dispense Refill    albuterol 108 (90 Base) MCG/ACT Inhalation Aero Soln Inhale 2 puffs into the lungs every 4 (four) hours as needed for Shortness of Breath or Wheezing. inhale 2 puff by inhalation route  every 4 - 6 hours as needed 3 each 3    ergocalciferol 1.25 MG (91690 UT) Oral Cap Take one capsule (87274 u total) by mouth once a week as directed. 12 capsule 3    clobetasol (TEMOVATE) 0.05 % External Solution Apply 1 mL topically 2 (two) times daily as needed. 50 mL 11    levothyroxine 125 MCG Oral Tab Take 1 tablet (125 mcg total) by mouth daily. 90 tablet 3    losartan 50 MG Oral Tab Take 1 tablet (50 mg total) by mouth daily. 90 tablet 3    SODIUM FLUORIDE 5000 PLUS 1.1 % Dental Cream USE TO BRUSH DAILY AT NIGHT TIME      guaiFENesin-codeine 100-10 MG/5ML Oral Solution Take 5 mL by mouth 3 (three) times daily as needed for cough. 180 mL 0    Fluticasone-Salmeterol (AIRDUO RESPICLICK 113/14) 113-14 MCG/ACT Inhalation Aerosol Powder, Breath Activated Inhale 1 puff into the lungs in the morning and 1 puff before bedtime. 1 each 5    Spacer/Aero-Holding Chambers ([facility]) Does not apply Misc       azithromycin (ZITHROMAX) 250 MG Oral Tab Take 1 tab q other day (Patient not  taking: Reported on 7/3/2024) 15 tablet 5     Allergies   Allergen Reactions    Aspirin BLEEDING           Fentanyl      Sweating after surgery    Minocycline HIVES    Nystatin-Triamcinolone ITCHING, Coughing and SWELLING    Penicillins HIVES    Pristiq [Desvenlafaxine] OTHER (SEE COMMENTS)     Hallucinations, headaches, dizziness    Sulfa Antibiotics SWELLING     Drugs      Tetracyclines & Related SWELLING    Mold UNKNOWN    Dyazide [Hydrochlorothiazide W/Triamterene] RASH    Lamotrigine RASH     Welts and hair loss    Metformin SWELLING     Gained 10 lb in 2 weeks    Wellbutrin [Bupropion Hcl] ITCHING     SR TBCR; Headache         Family History   Problem Relation Age of Onset    Arthritis Mother     Cancer Mother         Lung    Other (Other) Mother     Other Mother         lung cancer    Cancer Father         lung    Other (tobbaco use) Father     Other Father         lung cancer    Other (Other) Sister         Scleroderma    Other (Other) Other         Cousins with scleroderma    Other Sister         Scleraderma    Diabetes Neg     Thyroid disease Neg     Thyroid Disorder Neg      Social History     Occupational History    Occupation:    Tobacco Use    Smoking status: Never    Smokeless tobacco: Never   Vaping Use    Vaping status: Never Used   Substance and Sexual Activity    Alcohol use: Yes     Comment: rarely    Drug use: No    Sexual activity: Not on file        ROS:  Comprehensive system review obtained and negative except as mentioned above    Physical Exam:    Ht 5' 5\" (1.651 m)   Wt 198 lb 6.4 oz (90 kg)   LMP 07/27/2015 (Exact Date)   BMI 33.02 kg/m²   Constitutional: Awake, alert, no distress.   Psychological: Appropriate affect.  Respiratory: Unlabored breathing.  Bilateral lower extremity:  Inspection: skin is intact without any redness, deformity, or effusion.   Palpation: Palpable round golf ball sized lump along the left-sided low back overlying the sacroiliac joint which does send  the pain across the left-sided low back.  No palpable tenderness along the right buttock or ischial tuberosity.  Range of motion: Internal rotation of hip to approximately 25 degrees. External rotation of hip to approximately 45 degrees.  No groin or buttock pain elicited with rotation of the hip.  Stinchfield test negative.   Neuromuscular: Strength is normal and sensation is intact.  Vascular: Extremities are warm and well-perfused.  Lymph: Unremarkable.    Imaging: Imaging was personally viewed, independently interpreted and radiology report read.  Radiographs of the bilateral hips recently obtained demonstrates mild joint space narrowing of the left hip without any other significant degenerative changes appreciated.    Assessment/Plan:  Diagnoses and all orders for this visit:    Bilateral hip pain    Palpable mass of lower back  -     z Insight MRI SPINE LUMBAR (CPT=72148); Future    Hamstring tendinitis of right thigh      Assessment: 64-year-old female with palpable lump along the left-sided low back as well as symptoms most consistent with proximal hamstring tendinitis and possible bursitis along the ischial tuberosity.    Plan: With regards to the patient's left side, I discussed with her that at this point there is no clear etiology for the lump on the left side low back.  It was unable to be diagnosed via ultrasound I discussed we may be able to diagnose it through an MRI scan of the low back and this has been ordered.  With regards to the right side, the patient has symptoms most consistent with proximal hamstring tendinitis/bursitis but currently is not painful.  I provided the patient with a hip conditioning packet and encouraged her to perform hamstring stretches to alleviate her tightness and pain.  After the MRI of the low back has been obtained I can reach out to the patient through [employer] with the results.  Questions were sought answered satisfactorily.  She is happy with the plan will follow as  advised.      Galindo Jolly PA-C  [facility] Orthopedic Surgery    This note was dictated using Dragon software.  While it was briefly proofread prior to completion, some grammatical, spelling, and word choice errors due to dictation may still occur.

## 2024-07-11 ENCOUNTER — TELEPHONE (OUTPATIENT)
Dept: FAMILY MEDICINE CLINIC | Facility: CLINIC | Age: 64
End: 2024-07-11

## 2024-07-11 ENCOUNTER — HOSPITAL ENCOUNTER (EMERGENCY)
Facility: HOSPITAL | Age: 64
Discharge: HOME | End: 2024-07-11
Attending: EMERGENCY MEDICINE
Payer: COMMERCIAL

## 2024-07-11 ENCOUNTER — APPOINTMENT (OUTPATIENT)
Dept: GENERAL RADIOLOGY | Facility: HOSPITAL | Age: 64
End: 2024-07-11
Attending: EMERGENCY MEDICINE
Payer: COMMERCIAL

## 2024-07-11 VITALS
OXYGEN SATURATION: 99 % | TEMPERATURE: 98 F | DIASTOLIC BLOOD PRESSURE: 72 MMHG | SYSTOLIC BLOOD PRESSURE: 136 MMHG | HEART RATE: 70 BPM | BODY MASS INDEX: 32.99 KG/M2 | WEIGHT: 198 LBS | HEIGHT: 65 IN | RESPIRATION RATE: 18 BRPM

## 2024-07-11 DIAGNOSIS — J40 BRONCHITIS: Primary | ICD-10-CM

## 2024-07-11 LAB
ALBUMIN SERPL-MCNC: 3.7 G/DL (ref 3.4–5)
ALBUMIN/GLOB SERPL: 1 {RATIO} (ref 1–2)
ALP LIVER SERPL-CCNC: 104 U/L
ALT SERPL-CCNC: 40 U/L
ANION GAP SERPL CALC-SCNC: 6 MMOL/L (ref 0–18)
AST SERPL-CCNC: 26 U/L (ref 15–37)
BASOPHILS # BLD AUTO: 0.03 X10(3) UL (ref 0–0.2)
BASOPHILS NFR BLD AUTO: 0.5 %
BILIRUB SERPL-MCNC: 0.4 MG/DL (ref 0.1–2)
BUN BLD-MCNC: 11 MG/DL (ref 9–23)
CALCIUM BLD-MCNC: 9.1 MG/DL (ref 8.5–10.1)
CHLORIDE SERPL-SCNC: 105 MMOL/L (ref 98–112)
CO2 SERPL-SCNC: 28 MMOL/L (ref 21–32)
CREAT BLD-MCNC: 0.76 MG/DL
EGFRCR SERPLBLD CKD-EPI 2021: 89 ML/MIN/1.73M2 (ref 60–?)
EOSINOPHIL # BLD AUTO: 0.16 X10(3) UL (ref 0–0.7)
EOSINOPHIL NFR BLD AUTO: 2.4 %
ERYTHROCYTE [DISTWIDTH] IN BLOOD BY AUTOMATED COUNT: 13.8 %
FLUAV + FLUBV RNA SPEC NAA+PROBE: NEGATIVE
FLUAV + FLUBV RNA SPEC NAA+PROBE: NEGATIVE
GLOBULIN PLAS-MCNC: 3.6 G/DL (ref 2.8–4.4)
GLUCOSE BLD-MCNC: 94 MG/DL (ref 70–99)
HCT VFR BLD AUTO: 44.3 %
HGB BLD-MCNC: 14.3 G/DL
IMM GRANULOCYTES # BLD AUTO: 0.04 X10(3) UL (ref 0–1)
IMM GRANULOCYTES NFR BLD: 0.6 %
LYMPHOCYTES # BLD AUTO: 1.83 X10(3) UL (ref 1–4)
LYMPHOCYTES NFR BLD AUTO: 27.6 %
MCH RBC QN AUTO: 28 PG (ref 26–34)
MCHC RBC AUTO-ENTMCNC: 32.3 G/DL (ref 31–37)
MCV RBC AUTO: 86.7 FL
MONOCYTES # BLD AUTO: 0.57 X10(3) UL (ref 0.1–1)
MONOCYTES NFR BLD AUTO: 8.6 %
NEUTROPHILS # BLD AUTO: 4.01 X10 (3) UL (ref 1.5–7.7)
NEUTROPHILS # BLD AUTO: 4.01 X10(3) UL (ref 1.5–7.7)
NEUTROPHILS NFR BLD AUTO: 60.3 %
OSMOLALITY SERPL CALC.SUM OF ELEC: 287 MOSM/KG (ref 275–295)
PLATELET # BLD AUTO: 242 10(3)UL (ref 150–450)
POTASSIUM SERPL-SCNC: 4.3 MMOL/L (ref 3.5–5.1)
PROT SERPL-MCNC: 7.3 G/DL (ref 6.4–8.2)
RBC # BLD AUTO: 5.11 X10(6)UL
RSV RNA SPEC NAA+PROBE: NEGATIVE
SARS-COV-2 RNA RESP QL NAA+PROBE: NOT DETECTED
SODIUM SERPL-SCNC: 139 MMOL/L (ref 136–145)
TROPONIN I SERPL HS-MCNC: 4 NG/L
WBC # BLD AUTO: 6.6 X10(3) UL (ref 4–11)

## 2024-07-11 PROCEDURE — 85025 COMPLETE CBC W/AUTO DIFF WBC: CPT | Performed by: EMERGENCY MEDICINE

## 2024-07-11 PROCEDURE — 93005 ELECTROCARDIOGRAM TRACING: CPT

## 2024-07-11 PROCEDURE — 0241U SARS-COV-2/FLU A AND B/RSV BY PCR (GENEXPERT): CPT | Performed by: EMERGENCY MEDICINE

## 2024-07-11 PROCEDURE — 93010 ELECTROCARDIOGRAM REPORT: CPT

## 2024-07-11 PROCEDURE — 99284 EMERGENCY DEPT VISIT MOD MDM: CPT

## 2024-07-11 PROCEDURE — 94640 AIRWAY INHALATION TREATMENT: CPT

## 2024-07-11 PROCEDURE — 36415 COLL VENOUS BLD VENIPUNCTURE: CPT

## 2024-07-11 PROCEDURE — 84484 ASSAY OF TROPONIN QUANT: CPT | Performed by: EMERGENCY MEDICINE

## 2024-07-11 PROCEDURE — 80053 COMPREHEN METABOLIC PANEL: CPT | Performed by: EMERGENCY MEDICINE

## 2024-07-11 PROCEDURE — 71046 X-RAY EXAM CHEST 2 VIEWS: CPT | Performed by: EMERGENCY MEDICINE

## 2024-07-11 RX ORDER — IPRATROPIUM BROMIDE AND ALBUTEROL SULFATE 2.5; .5 MG/3ML; MG/3ML
3 SOLUTION RESPIRATORY (INHALATION) ONCE
Status: COMPLETED | OUTPATIENT
Start: 2024-07-11 | End: 2024-07-11

## 2024-07-11 RX ORDER — PREDNISONE 20 MG/1
40 TABLET ORAL DAILY
Qty: 10 TABLET | Refills: 0 | Status: SHIPPED | OUTPATIENT
Start: 2024-07-11 | End: 2024-07-16

## 2024-07-11 RX ORDER — PREDNISONE 20 MG/1
60 TABLET ORAL ONCE
Status: COMPLETED | OUTPATIENT
Start: 2024-07-11 | End: 2024-07-11

## 2024-07-11 NOTE — DISCHARGE INSTRUCTIONS
Albuterol nebulizer every 4-6 hours, wean as tolerated prednisone as prescribed  Tylenol for pain  Avoid NSAIDs as you are on prednisone  Return if worse  Follow-up PCP next week

## 2024-07-11 NOTE — TELEPHONE ENCOUNTER
Pt states has been having cough, congestion, SOB x 1 week and getting worse. No Covid test done, no fevers, had one episode when there seemed to be blood in phlegm. Advised Pt to go to ER for evaluation

## 2024-07-11 NOTE — ED INITIAL ASSESSMENT (HPI)
Pt c/o cough, fatigue, and pain in her chest. Pt has been using her rescue inhaler. Symptoms started July 3rd.

## 2024-07-11 NOTE — ED PROVIDER NOTES
Patient Seen in: [facility] Emergency Department      History     Chief Complaint   Patient presents with    Cough    Chest Pain     Stated Complaint: not feeling well, cough, asthma    Subjective:   HPI    This is a 64-year-old female past medical history morbid obesity, high cholesterol, depression, GERD, hypothyroidism who presents with cough, fatigue and chest pain.  Symptoms began on July 3.  Patient does have asthma.  She states has been using albuterol at home without the spacer which gives her some relief.  She denies any fever.  No chills.  No nausea, vomiting or diarrhea.  No sick contacts.  She presents here for further evaluation.    Objective:   Past Medical History:    Abdominal hernia    Allergic rhinitis    Allergy, unspecified not elsewhere classified    Asthma (HCC)    Atypical mole    removed    Back pain    Bloating    Borderline diabetes    Dx in 2013    Chronic cough    Depressive disorder    Fatigue    Flatulence/gas pain/belching    GERD (gastroesophageal reflux disease)    worst at night    Heartburn    Hemorrhoids    Herpes simplex without mention of complication    High cholesterol    History of depression    several years ago    History of general anesthesia complication    vomiting    Hypothyroidism    Dx in 2015    Night sweats    Pain in joints    Pain with bowel movements    Shortness of breath    Skin blushing/flushing    Sputum production    Stool incontinence    Stress    Ulcer of esophagus with bleeding    Vitamin D deficiency    Dx in 2012    Wears glasses    Weight gain              Past Surgical History:   Procedure Laterality Date    Col[facility]    Colonoscopy      Egd  08/25/2017    [facility]    Other surgical history      Dental surgeries    Sigmoidoscopy,diagnostic      years ago    Tonsillectomy      In childhood                Social History     Socioeconomic History    Marital status:     Number  of children: 0   Occupational History    Occupation:    Tobacco Use    Smoking status: Never    Smokeless tobacco: Never   Vaping Use    Vaping status: Never Used   Substance and Sexual Activity    Alcohol use: Not Currently     Comment: rarely    Drug use: No   Other Topics Concern    Caffeine Concern Yes     Comment: 3-4 cups of tea daily    Exercise Yes     Comment: walking 30 minutes per day 4-5 times a week    Seat Belt Yes    Self-Exams Yes     Comment: self breast exam a couple times a month     Social Determinants of Health      Received from [facility]              Review of Systems    Positive for stated Chief Complaint: Cough and Chest Pain    Other systems are as noted in HPI.  Constitutional and vital signs reviewed.      All other systems reviewed and negative except as noted above.    Physical Exam     ED Triage Vitals [07/11/24 1205]   BP (!) 172/95   Pulse 80   Resp 20   Temp 98 °F (36.7 °C)   Temp src    SpO2 99 %   O2 Device None (Room air)       Current Vitals:   Vital Signs  BP: 146/77  Pulse: 77  Resp: 21  Temp: 98 °F (36.7 °C)  MAP (mmHg): 98    Oxygen Therapy  SpO2: 99 %  O2 Device: None (Room air)            Physical Exam    GENERAL: Awake, alert oriented x3, nontoxic appearing.   SKIN: Normal, warm, and dry.  HEENT:  Pupils equally round and reactive to light. Conjuctiva clear.  Oropharynx is clear and moist.   Lungs: Coarse with slight expiratory wheeze.    HEART:  Regular rate and rhythm. S1 and S2. No murmurs, no rubs or gallops.   ABDOMEN: Soft, nontender and nondistended. Normoactive bowel sounds. No rebound. No guarding.   EXTREMITIES: Warm with brisk capillary refill.         ED Course     Labs Reviewed   COMP METABOLIC PANEL (14) - Normal   TROPONIN I HIGH SENSITIVITY - Normal   SARS-COV-2/FLU A AND B/RSV BY PCR (GENEXPERT) - Normal    Narrative:     This test is intended for the qualitative detection and differentiation of SARS-CoV-2, influenza A,  influenza B, and respiratory syncytial virus (RSV) viral RNA in nasopharyngeal or nares swabs from individuals suspected of respiratory viral infection consistent with COVID-19 by their healthcare provider. Signs and symptoms of respiratory viral infection due to SARS-CoV-2, influenza, and RSV can be similar.    Test performed using the Xpert Xpress SARS-CoV-2/FLU/RSV (real time RT-PCR)  assay on the GeneXpert instrument, [employer], [address] [address].   This test is being used under the Food and Drug Administration's Emergency Use Authorization.    The authorized Fact Sheet for Healthcare Providers for this assay is available upon request from the laboratory.   CBC WITH DIFFERENTIAL WITH PLATELET    Narrative:     The following orders were created for panel order CBC With Differential With Platelet.  Procedure                               Abnormality         Status                     ---------                               -----------         ------                     CBC W/ DIFFERENTIAL[[id number]]                              Final result                 Please view results for these tests on the individual orders.   UA HOLD   CBC W/ DIFFERENTIAL     EKG    Rate, intervals and axes as noted on EKG Report.  Rate: 73  Rhythm: Sinus Rhythm  Reading: Low voltage.  No acute changes                 XR CHEST PA + LAT CHEST (CPT=71046)    Result Date: 7/11/2024  PROCEDURE:  XR CHEST PA + LAT CHEST (CPT=71046)  INDICATIONS:  not feeling well, cough, asthma, chest tightness today  COMPARISON:  ANALI SALVADOR, XR CHEST PA + LAT CHEST (CPT=71046), 6/07/2019, 2:48 PM.  TECHNIQUE:  PA and lateral chest radiographs were obtained.  PATIENT STATED HISTORY: (As transcribed by Technologist)  Patient states cough and chest tightness.    FINDINGS:  Lung volumes are satisfactory.  No new consolidation or pleural effusion.  Heart and pulmonary vessels appear stable, normal caliber.  Smooth mediastinal contours.  No pneumothorax.              CONCLUSION:  No evidence of active cardiopulmonary disease.   LOCATION:  [facility]   Dictated by (CST): Cesar Terry MD on 7/11/2024 at 2:30 PM     Finalized by (CST): Cesar Terry MD on 7/11/2024 at 2:31 PM              MDM        This is a 64-year-old female past medical history morbid obesity, high cholesterol, depression, GERD, hypothyroidism who presents with cough, fatigue and chest pain differential includes pneumonia, bronchitis, viral URI.      Patient placed on cardiac monitor, continuous pulse oximetry and IV line was established of normal saline.  Patient was given a DuoNeb.  Prednisone 60 mg orally.  Basic labs were obtained.  CBC: White blood cell count 6.6.  Hemoglobin 14.3.  Platelet 242.      Flu/COVID/RSV negative      I independently viewed the chest x-ray which demonstrated infiltrates or pneumonia.  Also reviewed the radiology interpretation and agreement.      Findings consistent with bronchitis.  Will DC to continue albuterol nebulizers at home she has plenty of solution, will add prednisone.  Tylenol for any pain.  Return if worse.  Recheck with PCP next week.  Patient discharged home in good condition.          Disposition and Plan     Clinical Impression:  1. Bronchitis         Disposition:  Discharge  7/11/2024  3:04 pm    Follow-up:  Chico Olivo MD  [address]  [facility] [address] [phone]    Follow up on 7/15/2024            Medications Prescribed:  Current Discharge Medication List

## 2024-07-11 NOTE — TELEPHONE ENCOUNTER
Patient call with shortness of breath , fatigue, cough with blood last Monday, chest not expanding when breathing.    Request speak to a nurse.

## 2024-07-13 LAB
ATRIAL RATE: 73 BPM
P AXIS: 59 DEGREES
P-R INTERVAL: 152 MS
Q-T INTERVAL: 412 MS
QRS DURATION: 82 MS
QTC CALCULATION (BEZET): 453 MS
R AXIS: 22 DEGREES
T AXIS: 74 DEGREES
VENTRICULAR RATE: 73 BPM

## 2024-08-13 RX ORDER — NAPROXEN 500 MG/1
500 TABLET ORAL 2 TIMES DAILY PRN
Qty: 180 TABLET | Refills: 1 | Status: SHIPPED | OUTPATIENT
Start: 2024-08-13

## 2024-08-13 NOTE — TELEPHONE ENCOUNTER
Pt is calling requesting her Naproxin 500 mg twice a day to be refilled to [facility] on [address] in [place].  Previously refilled for 180 pills.

## 2024-08-13 NOTE — TELEPHONE ENCOUNTER
LOV 7/19/2024 (in-office)    Future Appointments   Date Time Provider Department Center   12/13/2024  2:45 PM Chico Olivo MD [facility]   12/18/2024  2:30 PM Bridgett Helton MD [facility] [place]   7/16/2025  8:00 AM James Bolivar MD [facility]      Naproxen last prescribed in 2020    Pended to Dr. Olivo for review

## 2024-09-25 ENCOUNTER — OFFICE VISIT (OUTPATIENT)
Dept: FAMILY MEDICINE CLINIC | Facility: CLINIC | Age: 64
End: 2024-09-25
Payer: COMMERCIAL

## 2024-09-25 VITALS
WEIGHT: 203.19 LBS | HEART RATE: 70 BPM | BODY MASS INDEX: 33.85 KG/M2 | HEIGHT: 65 IN | RESPIRATION RATE: 12 BRPM | SYSTOLIC BLOOD PRESSURE: 128 MMHG | DIASTOLIC BLOOD PRESSURE: 86 MMHG

## 2024-09-25 DIAGNOSIS — R07.89 CHEST WALL PAIN: Primary | ICD-10-CM

## 2024-09-25 DIAGNOSIS — Z12.31 VISIT FOR SCREENING MAMMOGRAM: ICD-10-CM

## 2024-09-25 PROCEDURE — 99213 OFFICE O/P EST LOW 20 MIN: CPT | Performed by: FAMILY MEDICINE

## 2024-09-25 RX ORDER — CYCLOBENZAPRINE HCL 10 MG
10 TABLET ORAL
COMMUNITY
Start: 2024-09-19

## 2024-09-25 RX ORDER — METAXALONE 800 MG/1
TABLET ORAL 3 TIMES DAILY PRN
Qty: 60 TABLET | Refills: 2 | Status: SHIPPED | OUTPATIENT
Start: 2024-09-25

## 2024-09-25 NOTE — PROGRESS NOTES
Kaylynn is a 64 year old female coming in for had concerns including ER F/U (Went in for really bad sharp chest pains ).    Subjective:   HPI tis in early Setpenber.     Bronchoits, seen in [facility] in 9/19  Seen after awakenin 1:30 with multiple focal chest pain all over chest without speicigfc area consisitent. Pain lasted seconds but quite severe in kvng.   Ibuprofen helped but gave stomache pains, using ibuprofen nightly with miusche relaxer.   Labs cardiace and CBC and CMP normal  Had another event 2 nightsa ago- not as severe but better.   EKG normal with LAE>     Objective:   /86   Pulse 70   Resp 12   Ht 5' 5\" (1.651 m)   Wt 203 lb 3.2 oz (92.2 kg)   LMP 07/27/2015 (Exact Date)   BMI 33.81 kg/m²  Body mass index is 33.81 kg/m².   Physical Exam  Constitutional:       Appearance: She is well-developed.   HENT:      Head: Normocephalic and atraumatic.   Cardiovascular:      Rate and Rhythm: Normal rate.      Pulses: Normal pulses.      Heart sounds: Normal heart sounds. No murmur heard.     No friction rub. No gallop.   Pulmonary:      Effort: Pulmonary effort is normal. No respiratory distress.      Breath sounds: Normal breath sounds.   Musculoskeletal:         General: Normal range of motion.      Cervical back: Normal range of motion and neck supple.   Skin:     Findings: No rash.   Neurological:      Mental Status: She is alert and oriented to person, place, and time.   Psychiatric:         Mood and Affect: Mood normal.         Behavior: Behavior normal.         Thought Content: Thought content normal.         Judgment: Judgment normal.           Assessment & Plan:   1. Chest wall pain (Primary)  -     Metaxalone; Take 0.5-1 tablets (400-800 mg total) by mouth 3 (three) times daily as needed for Pain.  Dispense: 60 tablet; Refill: 2  2. Visit for screening mammogram  -     3D Mammogram Digital Screen, Bilateral (CPT=77067/03937); Future; Expected date: 12/25/2024  Symptoms seem to be more  musculoskeletal and not heart related.  Arrange for muscle relaxant easier than the Flexeril given in the hands daily ER.  I reviewed the instill notes and follow-up if no great improvement.      I am having Kaylynn Plummer start on Metaxalone. I am also having her maintain her OptiChamber Kami, Fluticasone-Salmeterol, Sodium Fluoride 5000 Plus, levothyroxine, losartan, ergocalciferol, albuterol, azithromycin, clobetasol, guaiFENesin-codeine, naproxen, and cyclobenzaprine.       No follow-ups on file.

## 2024-11-16 DIAGNOSIS — F33.41 RECURRENT MAJOR DEPRESSIVE DISORDER, IN PARTIAL REMISSION (HCC): ICD-10-CM

## 2024-11-22 RX ORDER — LEVOTHYROXINE SODIUM 125 UG/1
125 TABLET ORAL DAILY
Qty: 90 TABLET | Refills: 3 | Status: SHIPPED | OUTPATIENT
Start: 2024-11-22

## 2024-11-22 NOTE — TELEPHONE ENCOUNTER
Requested Prescriptions     Pending Prescriptions Disp Refills    LEVOTHYROXINE 125 MCG Oral Tab [Pharmacy Med Name: LEVOTHYROXINE 0.125MG (125MCG) TAB] 90 tablet 3     Sig: TAKE 1 TABLET(125 MCG) BY MOUTH DAILY     LOV 9/25/2024     Patient was asked to follow-up in: no follow up on file     Appointment scheduled: 12/13/2024 Chico Olivo MD     Medication failed protocol.    Routed to front staff    Patient is due for their annul physical please call patient and have them schedule an appointment

## 2024-12-12 NOTE — ASSESSMENT & PLAN NOTE
Clinical Course: stable   Good control    Not currently on Behavioral health meds.     Orders:    Levothyroxine Sodium; Take 1 tablet (125 mcg total) by mouth daily.  Dispense: 90 tablet; Refill: 3

## 2024-12-12 NOTE — ASSESSMENT & PLAN NOTE
4/24/2023: HEMOGLOBIN A1c 6.1 (H)  7/11/2024: Glucose 94 Sugar control is stable  Continue with current treatment plan  Pre-Diabetic. Not currently on Diabetic meds.

## 2024-12-12 NOTE — ASSESSMENT & PLAN NOTE
BP shows borderline control with last BP of 130/84. Work on lifestyle changes, diet, exercise and weight management.   7/11/2024: Potassium 4.3; Creatinine 0.76; eGFR-Cr 89  BP Meds: losartan Tabs - 50 MG   ACE/ARB Adherence Excellent at 99%      Orders:    Losartan Potassium; Take 1 tablet (50 mg total) by mouth daily.  Dispense: 90 tablet; Refill: 3

## 2024-12-13 ENCOUNTER — OFFICE VISIT (OUTPATIENT)
Dept: FAMILY MEDICINE CLINIC | Facility: CLINIC | Age: 64
End: 2024-12-13
Payer: COMMERCIAL

## 2024-12-13 VITALS
DIASTOLIC BLOOD PRESSURE: 84 MMHG | HEIGHT: 65 IN | HEART RATE: 80 BPM | SYSTOLIC BLOOD PRESSURE: 130 MMHG | WEIGHT: 201 LBS | BODY MASS INDEX: 33.49 KG/M2 | RESPIRATION RATE: 12 BRPM

## 2024-12-13 DIAGNOSIS — K21.00 GASTROESOPHAGEAL REFLUX DISEASE WITH ESOPHAGITIS WITHOUT HEMORRHAGE: ICD-10-CM

## 2024-12-13 DIAGNOSIS — R73.03 PREDIABETES: ICD-10-CM

## 2024-12-13 DIAGNOSIS — I10 ESSENTIAL HYPERTENSION: Primary | ICD-10-CM

## 2024-12-13 DIAGNOSIS — F33.41 RECURRENT MAJOR DEPRESSIVE DISORDER, IN PARTIAL REMISSION (HCC): ICD-10-CM

## 2024-12-13 PROCEDURE — 99214 OFFICE O/P EST MOD 30 MIN: CPT | Performed by: FAMILY MEDICINE

## 2024-12-13 RX ORDER — LEVOTHYROXINE SODIUM 125 UG/1
125 TABLET ORAL DAILY
Qty: 90 TABLET | Refills: 3 | Status: SHIPPED | OUTPATIENT
Start: 2024-12-13

## 2024-12-13 RX ORDER — LOSARTAN POTASSIUM 50 MG/1
50 TABLET ORAL DAILY
Qty: 90 TABLET | Refills: 3 | Status: SHIPPED | OUTPATIENT
Start: 2024-12-13

## 2024-12-13 RX ORDER — PANTOPRAZOLE SODIUM 40 MG/1
40 TABLET, DELAYED RELEASE ORAL
Qty: 90 TABLET | Refills: 1 | Status: SHIPPED | OUTPATIENT
Start: 2024-12-13

## 2024-12-13 NOTE — PROGRESS NOTES
Received response requested fax with the pharmacy comments for ESCITALOPRAM:   Alternative requested. Insurance requires 90 day supply  Put in MD box     Subjective:   Kaylynn is a 64 year old female coming in for had concerns including Blood Pressure (Check up /), Heartburn (Started a month ago ), Skin (Change in color on arms ), and Stool (A change in stool would like to discuss ).   HPI   Arms striped- no textur change.   Tagamet works but worried about it. Worst in evening, change sleeping position.   Had colonoscoply in 2021, EGD 8/2017.  Has BM and pain in lower abd. , lasting up to 5 minutes.     Objective:   /84   Pulse 80   Resp 12   Ht 5' 5\" (1.651 m)   Wt 201 lb (91.2 kg)   LMP 07/27/2015 (Exact Date)   BMI 33.45 kg/m²  Body mass index is 33.45 kg/m².   Physical Exam  Vitals and nursing note reviewed.   Constitutional:       General: She is not in acute distress.     Appearance: Normal appearance.   HENT:      Head: Normocephalic.   Cardiovascular:      Rate and Rhythm: Normal rate and regular rhythm.      Pulses:           Posterior tibial pulses are 2+ on the right side and 2+ on the left side.      Heart sounds: Normal heart sounds. No murmur heard.  Pulmonary:      Effort: Pulmonary effort is normal. No respiratory distress.      Breath sounds: Normal breath sounds. No wheezing.   Abdominal:      General: Bowel sounds are normal.      Palpations: Abdomen is soft.      Tenderness: There is no abdominal tenderness.   Musculoskeletal:      Cervical back: Normal range of motion.      Right lower leg: No edema.      Left lower leg: No edema.   Skin:     General: Skin is warm and dry.      Findings: No rash.   Neurological:      Mental Status: She is alert and oriented to person, place, and time.   Psychiatric:         Mood and Affect: Mood normal.         Behavior: Behavior normal.         Thought Content: Thought content normal.         Judgment: Judgment normal.       No sking textrue chenage but some colol.     Assessment & Plan  Essential hypertension  BP shows borderline control with last BP of 130/84. Work on lifestyle changes, diet,  exercise and weight management.   7/11/2024: Potassium 4.3; Creatinine 0.76; eGFR-Cr 89  BP Meds: losartan Tabs - 50 MG   ACE/ARB Adherence Excellent at 99%      Orders:    Losartan Potassium; Take 1 tablet (50 mg total) by mouth daily.  Dispense: 90 tablet; Refill: 3    Recurrent major depressive disorder, in partial remission (HCC)  Clinical Course: stable   Good control    Not currently on Behavioral health meds.     Orders:    Levothyroxine Sodium; Take 1 tablet (125 mcg total) by mouth daily.  Dispense: 90 tablet; Refill: 3    Prediabetes  4/24/2023: HEMOGLOBIN A1c 6.1 (H)  7/11/2024: Glucose 94 Sugar control is stable  Continue with current treatment plan  Pre-Diabetic. Not currently on Diabetic meds.          Gastroesophageal reflux disease with esophagitis without hemorrhage  Failed tagmanet but increased to portonix for 6 to 12 weeks, had EGD 7.5 years ago, may need to repeat if this continues beyond 3 months.   Orders:    Pantoprazole Sodium; Take 1 tablet (40 mg total) by mouth every morning before breakfast.  Dispense: 90 tablet; Refill: 1             I have changed Kaylynn Montesis Elian's levothyroxine. I am also having her start on pantoprazole. Additionally, I am having her maintain her OptiChamber Kami, Fluticasone-Salmeterol, Sodium Fluoride 5000 Plus, ergocalciferol, albuterol, azithromycin, clobetasol, guaiFENesin-codeine, naproxen, cyclobenzaprine, Metaxalone, and losartan.       Return in about 4 months (around 4/13/2025) for Annual physical.

## 2024-12-18 ENCOUNTER — OFFICE VISIT (OUTPATIENT)
Facility: CLINIC | Age: 64
End: 2024-12-18
Payer: COMMERCIAL

## 2024-12-18 VITALS
RESPIRATION RATE: 16 BRPM | HEIGHT: 65 IN | SYSTOLIC BLOOD PRESSURE: 130 MMHG | DIASTOLIC BLOOD PRESSURE: 82 MMHG | WEIGHT: 201 LBS | HEART RATE: 77 BPM | OXYGEN SATURATION: 96 % | BODY MASS INDEX: 33.49 KG/M2

## 2024-12-18 DIAGNOSIS — R05.3 CHRONIC COUGH: ICD-10-CM

## 2024-12-18 DIAGNOSIS — R91.1 PULMONARY NODULE: Primary | ICD-10-CM

## 2024-12-18 DIAGNOSIS — J45.30 MILD PERSISTENT ASTHMA WITHOUT COMPLICATION (HCC): ICD-10-CM

## 2024-12-18 PROCEDURE — 99214 OFFICE O/P EST MOD 30 MIN: CPT | Performed by: INTERNAL MEDICINE

## 2024-12-18 NOTE — PATIENT INSTRUCTIONS
Plan--  - continue airduo as needed on bad days--- plan to increase to twice a day if you get sick - or on a bad day                - consider  flonase 1-2 puffs at night- with congestion               - to get home sleep study - [phone]         -               - see me  in 6 months after CT scan                 Bridgett Helton MD  Pulmonary Medicine  12/18/24

## 2024-12-18 NOTE — PROGRESS NOTES
Pulmonary Consult Note    History of Present Illness:  Kaylynn Plummer is a 64 year old female presenting to pulmonary clinic today for cough   Since a kid- cough - allergies- then asthma -   Fine during covid- - thinks in office gets sick-- fine when she was home- for 2 months -   Years ago had job - found unchanged filters - caused cough -- now not better even at home   Frustrated and aggravated  Starts coughing then get congested -   Some acid relfux-   When lays down gets congested and gets fluid in ears - coughs when roles over--   covid then the flu since xmas-- gags - had paxlovid   Some shortness of breath - walks a mile and up and down stairs without dyspnea - if flares -- rescue helps   Rescue inhaler not working and no with recent prednsione and antibiotic -prednisone not much help - helps some -- no fevers   Way better now   Wheeze when sick- - on a good day no breathing limitations  No help from symbicort -   Rescue daily as needed   No meds - xyzal and cough syrup   flonase and others gives her HA-   Singular makes her swell   Had allergies shot in [place] - helpful - and again with ENT -   Smoking never - both parents smoked -     Singular retry- - noted swelling - hands and feet- - had been on nongenertic without issues - for years-     Taste bad with dymista - -   Coughing up cottage cheese smelling - - for months - better now -   No dyspnea -   Shows dogs - - sayomda   Test negative to dogs-- mold and grass   No shortness of breath --   Dizzy at home - on couch - sitting-       dulera not covered - - was short of breath - and got tired- then took dulera and it helped -sleeping and taking naps   Trying to lose weight   Coughing more with allergies - cough now not prod-   One piece - small curd cottage cheese-   Today is good day   Sleeping - coughed all night last night - -      8/23 --really sick 5-6 weeks ago-- thought RSV? Neg covid   Required predisone for 3 weeks-- dr anna Jaime  first --   Bad taste-- stopped during prednisone -   Bad teeth problems post prednisone --   Needs to have jaw fractured/reconstructed -- -- teeth without enamel-   Unable to get dentures -     12.23- dog got title - and for grand title   Doing pretty well- was sick 2-3 months ago  Ongoing acid reflux - at night -- despite tagamet -   No cough   Airduo twice a day - -   Ongoing teeth issues - root canal etc   No congestion until dusting - mold on wall of pantry -- to get contractor- - - and sealed off -   Rescue - rare - only if sick   6/24- no er/c doing well   Sl dizzyness - rare use of rescue   Advair  250 one puff every morning mostly   Stopped flonase-- now with congestion -     12/24- had bad GERD - then resolved prior to starting increased meds- was all day long -- was on tagamet - no trouble at all -   Grand champion now   Breathing is pretty good- was in [place] with costochronditis - no recurrance- no meds now   Takes advair 1-2 times per week - for lung tightness in am   States will now get sleep study                Past Medical History:   Past Medical History:    Abdominal hernia    Allergic rhinitis    Allergy, unspecified not elsewhere classified    Asthma (HCC)    Atypical mole    removed    Back pain    Bloating    Borderline diabetes    Dx in 2013    Chronic cough    Depressive disorder    Fatigue    Flatulence/gas pain/belching    GERD (gastroesophageal reflux disease)    worst at night    Heartburn    Hemorrhoids    Herpes simplex without mention of complication    High cholesterol    History of depression    several years ago    History of general anesthesia complication    vomiting    Hypothyroidism    Dx in 2015    Night sweats    Pain in joints    Pain with bowel movements    Shortness of breath    Skin blushing/flushing    Sputum production    Stool incontinence    Stress    Ulcer of esophagus with bleeding    Vitamin D deficiency    Dx in 2012    Wears glasses    Weight gain   - no  cardiac hx - Stress echo 12/2021- told neg   - htn - controlled   - tagamet now for gerd- hx ulcers in high school --- bleeding   - no cancers-- skin cancer - blazek  - no clot         Past Surgical History:   Past Surgical History:   Procedure Laterality Date    Colonoscopy  08/25/2017    [facility]    Colonoscopy      Egd  08/25/2017    Dr. [facility]    Other surgical history      Dental surgeries    Sigmoidoscopy,diagnostic      years ago    Tonsillectomy      In childhood       Family Medical History:   Family History   Problem Relation Age of Onset    Arthritis Mother     Cancer Mother         Lung    Other (Other) Mother     Other Mother         lung cancer    Cancer Father         lung    Other (tobbaco use) Father     Other Father         lung cancer    Other (Other) Sister         Scleroderma    Other (Other) Other         Cousins with scleroderma    Other Sister         Scleraderma    Diabetes Neg     Thyroid disease Neg     Thyroid Disorder Neg        Social History: Social History    Socioeconomic History      Marital status:       Number of children: 0    Occupational History      Occupation:     Tobacco Use      Smoking status: Never      Smokeless tobacco: Never    Vaping Use      Vaping status: Never Used    Substance and Sexual Activity      Alcohol use: Yes        Comment: rarely      Drug use: No    Other Topics      Concerns:        Caffeine Concern: Yes          3-4 cups of tea daily        Exercise: Yes          walking 30 minutes per day 4-5 times a week        Seat Belt: Yes        Self-Exams: Yes          self breast exam a couple times a month       Allergies: Aspirin, Fentanyl, Minocycline, Nystatin-triamcinolone, Penicillins, Pristiq [desvenlafaxine], Sulfa antibiotics, Tetracyclines & related, Mold, Molds & smuts, Dyazide [hydrochlorothiazide w/triamterene], Lamotrigine, Metformin, and Wellbutrin [bupropion hcl]     Medications:    Current Outpatient Medications   Medication Sig Dispense Refill    losartan 50 MG Oral Tab Take 1 tablet (50 mg total) by mouth daily. 90 tablet 3    levothyroxine 125 MCG Oral Tab Take 1 tablet (125 mcg total) by mouth daily. 90 tablet 3    cyclobenzaprine 10 MG Oral Tab Take 1 tablet (10 mg total) by mouth.      Metaxalone 800 MG Oral Tab Take 0.5-1 tablets (400-800 mg total) by mouth 3 (three) times daily as needed for Pain. 60 tablet 2    naproxen 500 MG Oral Tab Take 1 tablet (500 mg total) by mouth 2 (two) times daily as needed (Knee pain). 180 tablet 1    guaiFENesin-codeine 100-10 MG/5ML Oral Solution Take 5 mL by mouth 3 (three) times daily as needed for cough. 180 mL 0    azithromycin (ZITHROMAX) 250 MG Oral Tab Take 1 tab q other day 15 tablet 5    clobetasol (TEMOVATE) 0.05 % External Solution Apply 1 mL topically 2 (two) times daily as needed. 50 mL 5    albuterol 108 (90 Base) MCG/ACT Inhalation Aero Soln Inhale 2 puffs into the lungs every 4 (four) hours as needed for Shortness of Breath or Wheezing. inhale 2 puff by inhalation route  every 4 - 6 hours as needed 3 each 3    ergocalciferol 1.25 MG (52757 UT) Oral Cap Take one capsule (63064 u total) by mouth once a week as directed. 12 capsule 3    SODIUM FLUORIDE 5000 PLUS 1.1 % Dental Cream USE TO BRUSH DAILY AT NIGHT TIME      Fluticasone-Salmeterol (AIRDUO RESPICLICK 113/14) 113-14 MCG/ACT Inhalation Aerosol Powder, Breath Activated Inhale 1 puff into the lungs in the morning and 1 puff before bedtime. 1 each 5    Spacer/Aero-Holding Chambers ([facility]) Does not apply Misc       pantoprazole 40 MG Oral Tab EC Take 1 tablet (40 mg total) by mouth every morning before breakfast. (Patient not taking: Reported on 12/18/2024) 90 tablet 1       Review of Systems: weight - 25# in the last year --- now up again another 3 #   No swallowing issues - chokes on stuff - better with antibtioic and swollen throat   Nose clear   Sleeps elevated - --  increased GERD with recent illness - now on tagamet and tums -- then recurred with severe epsidoe and given PPI but did not start   Scopes up to date - bay   Swells with singular --   Swells with xyzal- started with some and to retry -- did not   Ongoing teeth issues multiple need for root canals and teeth removal--told needs to have jaw fracture and reconstruction    No skin hives or rashes - some rash at times   Sleeping - no hx- cant sleep now related to coughing - awakens with gerd -- 2-3 a year --notes increasing difficulty sleeping-- now sleeping slightly better with less congestion --remains with awakenings fatigue ongoing- -   Sleeping - never got the study       Physical Exam:  /82   Pulse 77   Resp 16   Ht 5' 5\" (1.651 m)   Wt 201 lb (91.2 kg)   LMP 07/27/2015 (Exact Date)   SpO2 96%   BMI 33.45 kg/m²    Constitutional: comfortable . No acute distress. - tearful at outset   HEENT: Head NC/AT. PEERL. Throat is clear nose is red and swollen - rt nearly shut   Cardio: Regular rate and rhythm. Normal S1 and S2. No murmurs.   Respiratory: Thorax symmetrical with no labored breathing. Clear to ausculation bilaterally with symmetrical breath sounds. No wheezing, rhonchi, rales, or crackles.   GI:  Abd soft, non-tender.  Extremities: No clubbing or cyanosis. No LE edema. No calf tenderness.no edema   Neurologic: A&Ox3. No gross motor deficits.  Skin: Warm, dry.no rashes or hives noted   Lymphatic: No cervical or supraclavicular lymphadenopathy.- sl node rt posterior cervical chain - soft   Psych: Calm, cooperative. Pleasant affect.    Results:  Reviewed     Assessment/Plan:    #History of chronic allergic rhinitis/sinusitis  Notes lifelong history of allergies transient shots in college  Has followed with [facility] in the past  Clear sinus CT 2019  Flare 1/16/2023-severe coughing and congestion now improved  To focus on treatment, check eos and IgE  4.23-- blood pending - limited by meds-  //side effects   5/23- tolerating sinus sensitive flonase and rare prod of pieces with cough   8/23-recent flare with illness now improved with plans to continue and await blood testing  12.23 - to resume -with increased congestion with housework   Had been doing well without meds   12/24- has been fine-- no meds - using flonase as needed             #asthma  Normal PFTs 2018, History of a positive methacholine challenge 3/2019  Negative chest x-ray 7/22 by report  Denies significant improvement in symptoms following rescue  Now with cough prod of shaped material - r/o bronchiectasis   5/23 notes significant improvement with Dulera  Willing to retry Advair-states in the past had noted that if she did not take Advair she would have dyspnea--unclear  High-res CT without evidence of bronchiectasis  8/23 flare related to recent severe viral infection  Now sounds clear and improved off prednisone--planned for blood work when recovered  12.23- doing well with ICS/LABA twice a day - to cpm   6/24- continues to do well - to continue daily and self-escalate and following   12/24- doing well with advair 1-2 times per week to continue present management          -  #cough  Discussed at length suspect multifactorial with sinus disease reactive airways disease component GERD possible sensory neuropathic component as well  Overall better with cough drops  5/23 no evidence of bronchiectasis or ILD on recent high-res CT  8/23 now essentially resolved  12.23- remains resolved -rare congestion related to dust etc.  6/24- not an issue-- controlled   12/24- controlled with PRN advair and sinus meds     #History of hypertension  Denies any cardiac history with negative stress test 12/2021      #Long history of GERD  Bleeding ulcers in high school  Remains on Tagamet and elevating head of bed follows with [facility]  5/23 increasing GERD recommended to take Tagamet every day  12.23- ongoing sx-- to resume ppi and follow constipation    6/24- remains with ppi daily and well controlled-had had significant increase last week unclear why better now      #Groundglass pulmonary nodule  Incidentally noted on high-res CT 5/23-6.3 mm right upper lobe  No prior films and no history known  Plan for short interval follow-up discussed at length  Repeat in 4 months   12.23- no change-- for 6 months   6/24- stable on CT 6/14/24  -- sl smaller c/to 5/23 12/24 For repeat at one year --     # risk of LORA   Continues with awakenings and symptoms fatigue  Overnight still pending  Given severity of symptoms plan to proceed with sleep study  12.23- overnight still pending -   6/24- overnight with MILLA 23-- no signifcant desat- gets awoke by  and dog - nocturia - not so tired - thought remains with fatgue and awakening   To retry home study   12/24- states will get       Plan--  - continue airduo as needed on bad days--- plan to increase to twice a day if you get sick - or on a bad day                - consider  flonase 1-2 puffs at night- with congestion               - to get home sleep study - [phone]         -               - see me  in 6 months after CT scan                 Bridgett Helton MD  Pulmonary Medicine  12/18/24

## 2025-01-07 ENCOUNTER — TELEPHONE (OUTPATIENT)
Dept: FAMILY MEDICINE CLINIC | Facility: CLINIC | Age: 65
End: 2025-01-07

## 2025-01-07 NOTE — TELEPHONE ENCOUNTER
Hi Dr. Olivo,  Pt states she was told to call us with an update regarding her heartburn. She states it is improved with the medication she started one week ago.  Thank you.

## 2025-01-10 ENCOUNTER — TELEPHONE (OUTPATIENT)
Dept: SLEEP CENTER | Age: 65
End: 2025-01-10

## 2025-01-14 ENCOUNTER — HOSPITAL ENCOUNTER (OUTPATIENT)
Dept: MAMMOGRAPHY | Facility: HOSPITAL | Age: 65
Discharge: HOME | End: 2025-01-14
Attending: FAMILY MEDICINE
Payer: COMMERCIAL

## 2025-01-14 DIAGNOSIS — Z12.31 VISIT FOR SCREENING MAMMOGRAM: ICD-10-CM

## 2025-01-14 PROCEDURE — 77067 SCR MAMMO BI INCL CAD: CPT | Performed by: FAMILY MEDICINE

## 2025-01-14 PROCEDURE — 77063 BREAST TOMOSYNTHESIS BI: CPT | Performed by: FAMILY MEDICINE

## 2025-01-17 ENCOUNTER — OFFICE VISIT (OUTPATIENT)
Dept: SLEEP CENTER | Age: 65
End: 2025-01-17
Attending: INTERNAL MEDICINE
Payer: COMMERCIAL

## 2025-01-17 DIAGNOSIS — G47.33 OSA (OBSTRUCTIVE SLEEP APNEA): ICD-10-CM

## 2025-01-17 PROCEDURE — 95806 SLEEP STUDY UNATT&RESP EFFT: CPT

## 2025-01-21 ENCOUNTER — SLEEP STUDY (OUTPATIENT)
Facility: CLINIC | Age: 65
End: 2025-01-21
Payer: COMMERCIAL

## 2025-01-21 DIAGNOSIS — G47.9 SLEEP DISORDER: Primary | ICD-10-CM

## 2025-01-21 PROCEDURE — 95806 SLEEP STUDY UNATT&RESP EFFT: CPT | Performed by: OTHER

## 2025-01-22 NOTE — ADDENDUM NOTE
Addended by: PAULINA RUSH on: 1/22/2025 01:31 PM     Modules accepted: Orders, Level of Service

## 2025-02-05 ENCOUNTER — TELEPHONE (OUTPATIENT)
Dept: FAMILY MEDICINE CLINIC | Facility: CLINIC | Age: 65
End: 2025-02-05

## 2025-02-05 NOTE — TELEPHONE ENCOUNTER
Final Anesthesia Post-op Assessment    Patient: Esteban Jha  Procedure(s) Performed: OPEN RIGHT HEMICOLECTOMYSEGMENT #8  PARTIAL RIGHT LIVER RESECTION WITH ULTRASOUND  Anesthesia type: General    Vitals Value Taken Time   Temp 37.4 °C (99.32 °F) 03/09/21 1525   Pulse 71 03/09/21 1525   Resp 26 03/09/21 1525   SpO2 99 % 03/09/21 1525   /56 03/09/21 1527   Vitals shown include unvalidated device data.      Patient Location: ICU  Post-op Vital Signs:stable  Level of Consciousness: awake, alert, participates in exam and oriented  Respiratory Status: spontaneous ventilation and face mask  Cardiovascular stable, blood pressure returned to baseline and hypertensive  Hydration: euvolemic  Pain Management: adequately controlled  Handoff: Handoff to receiving clinician was performed and questions were answered  Vomiting: none   Nausea: None  Airway Patency:patent  Post-op Assessment: no complications, patient tolerated procedure well with no complications, no evidence of recall, dentition within defined limits, moving all extremities and No Corneal Abrasion      No complications documented.    Pt is calling because she's been having a sinus inf/cold. She's also had a bad taste in her mouth for the last 3 weeks     Not sure if she needs to be seen of something she should be doing

## 2025-02-05 NOTE — TELEPHONE ENCOUNTER
Spoke with patient. Patient has been having period bad taste in mouth at random, feeling extremely tired, and has cough for about 3 weeks now. Patient denies shortness of breath and fever. Patient was scheduled to see Mitchell Mckeon on 2/7/25. Pt encouraged to go to urgent care or ER if symptoms worsen or patient is having shortness of breath. Patient verbalized understanding.

## 2025-02-07 ENCOUNTER — OFFICE VISIT (OUTPATIENT)
Dept: FAMILY MEDICINE CLINIC | Facility: CLINIC | Age: 65
End: 2025-02-07
Payer: COMMERCIAL

## 2025-02-07 VITALS
BODY MASS INDEX: 34 KG/M2 | DIASTOLIC BLOOD PRESSURE: 66 MMHG | SYSTOLIC BLOOD PRESSURE: 122 MMHG | WEIGHT: 203 LBS | HEART RATE: 77 BPM | OXYGEN SATURATION: 99 % | RESPIRATION RATE: 18 BRPM

## 2025-02-07 DIAGNOSIS — R05.1 ACUTE COUGH: ICD-10-CM

## 2025-02-07 DIAGNOSIS — B37.0 THRUSH: Primary | ICD-10-CM

## 2025-02-07 PROCEDURE — 99213 OFFICE O/P EST LOW 20 MIN: CPT | Performed by: NURSE PRACTITIONER

## 2025-02-07 RX ORDER — PREDNISONE 20 MG/1
20 TABLET ORAL DAILY
Qty: 5 TABLET | Refills: 0 | Status: SHIPPED | OUTPATIENT
Start: 2025-02-07 | End: 2025-02-12

## 2025-02-07 RX ORDER — CLOTRIMAZOLE 10 MG/1
10 LOZENGE ORAL
Qty: 35 LOZENGE | Refills: 0 | Status: SHIPPED | OUTPATIENT
Start: 2025-02-07 | End: 2025-02-14

## 2025-02-07 NOTE — PATIENT INSTRUCTIONS
Take prednisone, 1 tab, daily for 5 days. Take early in the morning.   Do not take any Motrin, Advil, ibuprofen products or Aleve while taking this medication.    It is ok to take Tylenol (acetaminophen) while taking this medication. Follow  instructions for dosing and frequency schedule.

## 2025-02-07 NOTE — PROGRESS NOTES
Chief Complaint   Patient presents with    Other     Per pt has a bad taste in mouth X3 weeks     Sore Throat     X 2 days        HPI:  Presents with approx 3 week history of bad taste in mouth and 2 day history of sore throat and soreness of tongue. Did have upper respiratory illness about 3 weeks ago, is feeling better form this but does have linger cough. Denies altered taste of food Denies redness, swelling of tongue. Denies difficulty swallowing or breathing.  Denies fever/chills, nasal/sinus congestion, ear pain, SOB/THOMAS, body aches or fatigue. Has been treating with Mucinex without relief. Is also using generic Advair inhaler and albuterol as needed.      Past Medical History:    Abdominal hernia    Allergic rhinitis    Allergy, unspecified not elsewhere classified    Asthma (HCC)    Atypical mole    removed    Back pain    Bloating    Borderline diabetes    Dx in 2013    Chronic cough    Depressive disorder    Fatigue    Flatulence/gas pain/belching    GERD (gastroesophageal reflux disease)    worst at night    Heartburn    Hemorrhoids    Herpes simplex without mention of complication    High cholesterol    History of depression    several years ago    History of general anesthesia complication    vomiting    Hypothyroidism    Dx in 2015    Night sweats    Pain in joints    Pain with bowel movements    Shortness of breath    Skin blushing/flushing    Sputum production    Stool incontinence    Stress    Ulcer of esophagus with bleeding    Vitamin D deficiency    Dx in 2012    Wears glasses    Weight gain       Patient Active Problem List   Diagnosis    Major depression in partial remission    Rosacea    Hypothyroidism (acquired)    Mild persistent asthma (HCC)    Primary localized osteoarthrosis, hand    Prediabetes    TMJ dysfunction    Essential hypertension    Borderline diabetes    Vitamin D deficiency    Colon polyps    Gastric polyps    Hiatal hernia    Chronic cough    History of adenomatous polyp of  colon       Current Outpatient Medications   Medication Sig Dispense Refill    predniSONE 20 MG Oral Tab Take 1 tablet (20 mg total) by mouth daily for 5 days. 5 tablet 0    clotrimazole 10 MG Mouth/Throat Qiana Take 1 lozenge (10 mg total) by mouth 5 (five) times daily for 7 days. 35 lozenge 0    pantoprazole 40 MG Oral Tab EC Take 1 tablet (40 mg total) by mouth every morning before breakfast. 90 tablet 1    losartan 50 MG Oral Tab Take 1 tablet (50 mg total) by mouth daily. 90 tablet 3    levothyroxine 125 MCG Oral Tab Take 1 tablet (125 mcg total) by mouth daily. 90 tablet 3    cyclobenzaprine 10 MG Oral Tab Take 1 tablet (10 mg total) by mouth.      Metaxalone 800 MG Oral Tab Take 0.5-1 tablets (400-800 mg total) by mouth 3 (three) times daily as needed for Pain. 60 tablet 2    naproxen 500 MG Oral Tab Take 1 tablet (500 mg total) by mouth 2 (two) times daily as needed (Knee pain). 180 tablet 1    guaiFENesin-codeine 100-10 MG/5ML Oral Solution Take 5 mL by mouth 3 (three) times daily as needed for cough. 180 mL 0    azithromycin (ZITHROMAX) 250 MG Oral Tab Take 1 tab q other day 15 tablet 5    clobetasol (TEMOVATE) 0.05 % External Solution Apply 1 mL topically 2 (two) times daily as needed. 50 mL 5    albuterol 108 (90 Base) MCG/ACT Inhalation Aero Soln Inhale 2 puffs into the lungs every 4 (four) hours as needed for Shortness of Breath or Wheezing. inhale 2 puff by inhalation route  every 4 - 6 hours as needed 3 each 3    ergocalciferol 1.25 MG (05759 UT) Oral Cap Take one capsule (58675 u total) by mouth once a week as directed. 12 capsule 3    SODIUM FLUORIDE 5000 PLUS 1.1 % Dental Cream USE TO BRUSH DAILY AT NIGHT TIME      Fluticasone-Salmeterol (AIRDUO RESPICLICK 113/14) 113-14 MCG/ACT Inhalation Aerosol Powder, Breath Activated Inhale 1 puff into the lungs in the morning and 1 puff before bedtime. 1 each 5    Spacer/Aero-Holding Chambers ([facility]) Does not apply Misc          Physical  Exam  /66   Pulse 77   Resp 18   Wt 203 lb (92.1 kg)   LMP 07/27/2015 (Exact Date)   SpO2 99%   BMI 33.78 kg/m²   Constitutional: well developed, well nourished, in no apparent distress  HEENT: Normocephalic and atraumatic. Tympanic membranes clear with bulging bilat, no erythema or air/fluid levels. Oropharynx is erythematous without exudate, lesions or edema.Tongue is mildly erythematous with whitish coating, no edema, open lesions or drainage. (+)PND. No facial tenderness.  Eyes: Conjunctivae are pink and moist without exudate or drainage.   Lymphadenopathy: No cervical adenopathy.   Cardiovascular: Normal rate, regular rhythm.  No murmur.   Pulmonary/Chest: No respiratory distress. Effort normal. Breath sounds diminished bilaterally with no wheezes, rhonchi or rales appreciated. Frequent cough heard during exam.   Skin: Skin is warm and dry. No rash noted. No erythema. No pallor.     A/P:    Encounter Diagnoses   Name Primary?    Thrush- clotrimazole lozenges. Medication administration, use and side effects discussed. Instructed to notify office if not improved in 7 days or if symptoms worsen. Verbalized understanding of instructions and agreeable to this plan of care.     Yes    Acute cough- prednisone low dose burst. Stable. Continue current management, refills provided. Instructed to notify office if not improved in 4-5 days or if symptoms worsen. Verbalized understanding of instructions and agreeable to this plan of care.              No orders of the defined types were placed in this encounter.      Meds & Refills for this Visit:  Requested Prescriptions     Signed Prescriptions Disp Refills    predniSONE 20 MG Oral Tab 5 tablet 0     Sig: Take 1 tablet (20 mg total) by mouth daily for 5 days.    clotrimazole 10 MG Mouth/Throat Qiana 35 lozenge 0     Sig: Take 1 lozenge (10 mg total) by mouth 5 (five) times daily for 7 days.       Imaging & Consults:  None    No follow-ups on file.  Patient  Instructions         Take prednisone, 1 tab, daily for 5 days. Take early in the morning.   Do not take any Motrin, Advil, ibuprofen products or Aleve while taking this medication.    It is ok to take Tylenol (acetaminophen) while taking this medication. Follow  instructions for dosing and frequency schedule.       All questions were answered and the patient understands the plan.

## 2025-02-16 ENCOUNTER — HOSPITAL ENCOUNTER (OUTPATIENT)
Age: 65
Discharge: HOME | End: 2025-02-16
Payer: COMMERCIAL

## 2025-02-16 VITALS
OXYGEN SATURATION: 99 % | TEMPERATURE: 99 F | DIASTOLIC BLOOD PRESSURE: 98 MMHG | RESPIRATION RATE: 20 BRPM | HEART RATE: 88 BPM | SYSTOLIC BLOOD PRESSURE: 126 MMHG

## 2025-02-16 DIAGNOSIS — Z11.52 ENCOUNTER FOR SCREENING FOR COVID-19: ICD-10-CM

## 2025-02-16 DIAGNOSIS — J10.1 INFLUENZA A: Primary | ICD-10-CM

## 2025-02-16 DIAGNOSIS — R05.1 ACUTE COUGH: ICD-10-CM

## 2025-02-16 LAB
POCT INFLUENZA A: POSITIVE
POCT INFLUENZA B: NEGATIVE
SARS-COV-2 RNA RESP QL NAA+PROBE: NOT DETECTED

## 2025-02-16 RX ORDER — OSELTAMIVIR PHOSPHATE 75 MG/1
75 CAPSULE ORAL 2 TIMES DAILY
Qty: 10 CAPSULE | Refills: 0 | Status: SHIPPED | OUTPATIENT
Start: 2025-02-16 | End: 2025-02-21

## 2025-02-16 NOTE — ED PROVIDER NOTES
Patient Seen in: [facility]      History     Chief Complaint   Patient presents with    Cough/URI     Stated Complaint: Flu Symptoms    Subjective:   HPI    64 year old female pw flu like symptoms, +since yesterday.   Denies f/c/n/v/d. No recent sick exposures. Denies recent infections/covid exposures.   Here for flu testing.           Objective:     Past Medical History:    Abdominal hernia    Allergic rhinitis    Allergy, unspecified not elsewhere classified    Asthma (HCC)    Atypical mole    removed    Back pain    Bloating    Borderline diabetes    Dx in 2013    Chronic cough    Depressive disorder    Fatigue    Flatulence/gas pain/belching    GERD (gastroesophageal reflux disease)    worst at night    Heartburn    Hemorrhoids    Herpes simplex without mention of complication    High cholesterol    History of depression    several years ago    History of general anesthesia complication    vomiting    Hypothyroidism    Dx in 2015    Night sweats    Pain in joints    Pain with bowel movements    Shortness of breath    Skin blushing/flushing    Sputum production    Stool incontinence    Stress    Ulcer of esophagus with bleeding    Vitamin D deficiency    Dx in 2012    Wears glasses    Weight gain              Past Surgical History:   Procedure Laterality Date    Colonoscopy  08/25/2017    [facility]    Colonoscopy      Egd  08/25/2017    Dr. [facility]    Other surgical history      Dental surgeries    Sigmoidoscopy,diagnostic      years ago    Tonsillectomy      In childhood                Social History     Socioeconomic History    Marital status:     Number of children: 0   Occupational History    Occupation:    Tobacco Use    Smoking status: Never     Passive exposure: Never    Smokeless tobacco: Never   Vaping Use    Vaping status: Never Used   Substance and Sexual Activity    Alcohol use: Not Currently     Comment: rarely    Drug use: No   Other  Topics Concern    Caffeine Concern Yes     Comment: 3-4 cups of tea daily    Exercise Yes     Comment: walking 30 minutes per day 4-5 times a week    Seat Belt Yes    Self-Exams Yes     Comment: self breast exam a couple times a month     Social Drivers of Health      Received from [facility]    [facility]              Review of Systems    Positive for stated complaint: Flu Symptoms  Other systems are as noted in HPI.  Constitutional and vital signs reviewed.      All other systems reviewed and negative except as noted above.    Physical Exam     ED Triage Vitals   BP 02/16/25 0914 (!) 126/98   Pulse 02/16/25 0914 88   Resp 02/16/25 0914 20   Temp 02/16/25 0914 98.7 °F (37.1 °C)   Temp src 02/16/25 0914 Oral   SpO2 02/16/25 0914 99 %   O2 Device 02/16/25 0913 None (Room air)       Current Vitals:   Vital Signs  BP: (!) 126/98  Pulse: 88  Resp: 20  Temp: 98.7 °F (37.1 °C)  Temp src: Oral    Oxygen Therapy  SpO2: 99 %  O2 Device: None (Room air)        Physical Exam  Vitals and nursing note reviewed.   Constitutional:       General: She is not in acute distress.     Appearance: She is not toxic-appearing.   HENT:      Head: Normocephalic.      Nose: Nose normal. No congestion or rhinorrhea.      Mouth/Throat:      Mouth: Mucous membranes are moist.   Pulmonary:      Effort: Pulmonary effort is normal. No respiratory distress.   Abdominal:      General: Abdomen is flat.   Musculoskeletal:         General: Normal range of motion.      Cervical back: Normal range of motion.   Skin:     General: Skin is warm and dry.      Capillary Refill: Capillary refill takes less than 2 seconds.   Neurological:      General: No focal deficit present.      Mental Status: She is alert.             ED Course     Labs Reviewed   POCT FLU TEST - Abnormal; Notable for the following components:       Result Value    POCT INFLUENZA A Positive (*)     All other components within normal limits    Narrative:     This assay is a  rapid molecular in vitro test utilizing nucleic acid amplification of influenza A and B viral RNA.   RAPID SARS-COV-2 BY PCR - Normal                   MDM             Medical Decision Making  64 year old female p/w flu like symptoms,     Covid>negative  Flu>a positive, b negative    Will start tamiflu.   Discussed supportive care.   Note written for work.     Physical exam remained stable over serial reexaminations as previously documented. External chart review completed. No recent hospitalizations for the same.      I have discussed with the patient the results of tests, differential diagnosis, and warning signs and symptoms that should prompt immediate return.  The patient understands these instructions and agrees to the follow-up plan provided.  There are no barriers to learning.   Appropriate f/u given.  Patient agrees to return for any concerns/problems/complications.        Differential diagnosis reflecting the complexity of care include: Influenza a/b, covid, strep throat, viral syndrome    Comorbidities that add complexity to management include:na    External chart review was done and was noted:Yes    History obtained by an independent source was from: Patient    Discussions of management was done with:Patient    My independent interpretation of studies of:na    Diagnostic tests and medications considered but not ordered were:na    Social determinants of health that affect care:NA    Shared decision making was done by Self, Patient           Disposition and Plan     Clinical Impression:  1. Influenza A    2. Acute cough    3. Encounter for screening for COVID-19         Disposition:  Discharge  2/16/2025  9:32 am    Follow-up:  Chico Olivo MD  [address] [place] [id number]  [phone]                Medications Prescribed:  Discharge Medication List as of 2/16/2025  9:34 AM        START taking these medications    Details   oseltamivir (TAMIFLU) 75 MG Oral Cap Take 1 capsule (75 mg  total) by mouth 2 (two) times daily for 5 days., Normal, Disp-10 capsule, R-0                 Supplementary Documentation:

## 2025-02-16 NOTE — ED INITIAL ASSESSMENT (HPI)
Patient comes in states that she has a cough, body aches, sore throat, runny nose symptoms started yesterday

## 2025-02-16 NOTE — DISCHARGE INSTRUCTIONS
You have Influenza, this is a strong virus. It requires a lot of supportive care, rest, and fluids. There is no antibiotic as it is not a bacterial infection.  Tamiflu is an antiviral medication that can decrease your days and severity of symptoms, but does not take the virus away. Take this as prescribed for 5 days.    Increase water intake. DRINK A LOT OF WATER, GATORADE is good too if you are not eating well. This has electrolytes and will help with hydration. [place] diet if able to tolerate foods.  Rest. Wear a mask if you will be around others.  Runny Nose/Congestion:  Humidifier at bedside   Vicks vaporub under nose and chest wall  - Flonase nasal spray once or twice daily  - Antihistamine (Allegra, Zyrtec, Claritin) once daily.  Cough:  - Mucinex OR Robitussin  -Albuterol every 6 hours for cough, bronchospasm, shortness of breath  - Warm tea w/honey  - Humidifier in bedroom at night  Sore Throat:  - Salt water gargle  Fever:  Tylenol 375mg or Motrin 250mg every 6 hours for fever > 100.4. You can alternate between the two as well if fever high- one or the other every 3 hours.  Follow up with your primary care provider in the next 2-3 weeks if symptoms persist.  Go to the emergency room with chest pain, shortness of breath, dizziness, vomiting and unable to keep down fluids or medications, fatigue/weakness and not urinating.

## 2025-03-04 ENCOUNTER — OFFICE VISIT (OUTPATIENT)
Dept: FAMILY MEDICINE CLINIC | Facility: CLINIC | Age: 65
End: 2025-03-04
Payer: COMMERCIAL

## 2025-03-04 VITALS
DIASTOLIC BLOOD PRESSURE: 70 MMHG | WEIGHT: 201 LBS | SYSTOLIC BLOOD PRESSURE: 118 MMHG | BODY MASS INDEX: 33.49 KG/M2 | OXYGEN SATURATION: 100 % | HEIGHT: 65 IN | HEART RATE: 78 BPM | RESPIRATION RATE: 18 BRPM

## 2025-03-04 DIAGNOSIS — R73.03 PREDIABETES: ICD-10-CM

## 2025-03-04 DIAGNOSIS — F41.9 ANXIETY: ICD-10-CM

## 2025-03-04 DIAGNOSIS — E03.9 HYPOTHYROIDISM (ACQUIRED): Primary | ICD-10-CM

## 2025-03-04 DIAGNOSIS — B36.0 TINEA VERSICOLOR: ICD-10-CM

## 2025-03-04 PROCEDURE — 99214 OFFICE O/P EST MOD 30 MIN: CPT | Performed by: NURSE PRACTITIONER

## 2025-03-04 NOTE — PATIENT INSTRUCTIONS
Apply Ketoconazole shampoo to arms daily, lease on for 5 minutes then rinse off. Do this for 3 consecutive days then stop.     Please complete blood work as ordered. Do blood work fasting- no food or drink except for plain water- for 8 hours prior to testing. Ideally, labs would be done early in the morning.   You can Google [employer] to find locations and schedule this testing.       Stop Pantoprazole. Then take over the counter famotidine (Pepcid) 20mg, one tab twice daily (morning and evening) for 7 days. Then take famotidine, one tab once daily for 5 days. Then you may stop this medication.

## 2025-03-04 NOTE — PROGRESS NOTES
Chief Complaint   Patient presents with    Thyroid Problem     Per pt thyroid prescription was changed a couple months ago & feels like meds are making her gain weight, feeling tired and some anxiety.      HPI:  Presents with complaints of worsening anxiety, fatigue and weight gain since pharmacy started getting levothyroxine from a new supplier. Has continued to exercise (although a little less in the cold temperatures) and is watching her diet but notes weight gain. Does have prior diagnosis or prediabetes as well. Denies polyuria, polydipsia, polyphagia, shakiness, dizziness or visual changes. Denies heat/cold intolerance, constipation. Denies racing thoughts, panic attacks or suicidal ideations.    Also reports approx 1 year history of darkened discoloration to bilateral forearms that gets lighter in the summer as the rest of her skin gets darker. Reports area does not itch, burn, have open lesions or drainage. Has not treated with anything.     Past Medical History:    Abdominal hernia    Allergic rhinitis    Allergy, unspecified not elsewhere classified    Asthma (HCC)    Atypical mole    removed    Back pain    Bloating    Borderline diabetes    Dx in 2013    Cancer (HCC)    skin cancer    Chronic cough    Depression    many years ago    Depressive disorder    Fatigue    Flatulence/gas pain/belching    GERD (gastroesophageal reflux disease)    worst at night    Heartburn    Hemorrhoids    Herpes simplex without mention of complication    High cholesterol    History of depression    several years ago    History of general anesthesia complication    vomiting    Hypothyroidism    Dx in 2015    Night sweats    Pain in joints    Pain with bowel movements    Shortness of breath    Skin blushing/flushing    Sputum production    Stool incontinence    Stress    Ulcer of esophagus with bleeding    Vitamin D deficiency    Dx in 2012    Wears glasses    Weight gain       Patient Active Problem List   Diagnosis    Major  depression in partial remission    Rosacea    Hypothyroidism (acquired)    Mild persistent asthma (HCC)    Primary localized osteoarthrosis, hand    Prediabetes    TMJ dysfunction    Essential hypertension    Borderline diabetes    Vitamin D deficiency    Colon polyps    Gastric polyps    Hiatal hernia    Chronic cough    History of adenomatous polyp of colon       Current Outpatient Medications   Medication Sig Dispense Refill    Ketoconazole 1 % External Shampoo Apply 1 Application topically daily for 3 days. 125 mL 0    pantoprazole 40 MG Oral Tab EC Take 1 tablet (40 mg total) by mouth every morning before breakfast. 90 tablet 1    losartan 50 MG Oral Tab Take 1 tablet (50 mg total) by mouth daily. 90 tablet 3    levothyroxine 125 MCG Oral Tab Take 1 tablet (125 mcg total) by mouth daily. 90 tablet 3    cyclobenzaprine 10 MG Oral Tab Take 1 tablet (10 mg total) by mouth.      Metaxalone 800 MG Oral Tab Take 0.5-1 tablets (400-800 mg total) by mouth 3 (three) times daily as needed for Pain. 60 tablet 2    naproxen 500 MG Oral Tab Take 1 tablet (500 mg total) by mouth 2 (two) times daily as needed (Knee pain). 180 tablet 1    guaiFENesin-codeine 100-10 MG/5ML Oral Solution Take 5 mL by mouth 3 (three) times daily as needed for cough. 180 mL 0    clobetasol (TEMOVATE) 0.05 % External Solution Apply 1 mL topically 2 (two) times daily as needed. 50 mL 5    albuterol 108 (90 Base) MCG/ACT Inhalation Aero Soln Inhale 2 puffs into the lungs every 4 (four) hours as needed for Shortness of Breath or Wheezing. inhale 2 puff by inhalation route  every 4 - 6 hours as needed 3 each 3    ergocalciferol 1.25 MG (92175 UT) Oral Cap Take one capsule (68207 u total) by mouth once a week as directed. 12 capsule 3    SODIUM FLUORIDE 5000 PLUS 1.1 % Dental Cream USE TO BRUSH DAILY AT NIGHT TIME      Fluticasone-Salmeterol (AIRDUO RESPICLICK 113/14) 113-14 MCG/ACT Inhalation Aerosol Powder, Breath Activated Inhale 1 puff into the  lungs in the morning and 1 puff before bedtime. 1 each 5    Spacer/Aero-Holding Chambers ([employer] KARTHIKEYAN) Does not apply Misc          Physical Exam  /70   Pulse 78   Resp 18   Ht 5' 5\" (1.651 m)   Wt 201 lb (91.2 kg)   LMP 07/27/2015 (Exact Date)   SpO2 100%   BMI 33.45 kg/m²   Constitutional: well developed, well nourished, in no apparent distress  HEENT: Normocephalic and atraumatic.   Neck: Normal range of motion. Neck supple, thyroid non-enlarged.  Cardiovascular: Normal rate, regular rhythm.  No murmur.   Pulmonary/Chest: No respiratory distress. Effort normal. Breath sounds clear bilaterally. No wheezes, rhonchi or rales  Skin: Skin is warm and dry. Bilateral forearms with mildly darkened patches with no erythema, elevation, rough   texture, open area or drainage. No pallor.     A/P:    Encounter Diagnoses   Name Primary?    Hypothyroidism (acquired)- check TSH to ensure levels controlled. Management dependent on results. Verbalized understanding of instructions and agreeable to this plan of care.    Yes    Prediabetes- check A1C, discussed importance of exercise and good diet. Verbalized understanding of instructions and agreeable to this plan of care.        Anxiety- declines medication for anxiety at this time. Await lab results. Verbalized understanding of instructions and agreeable to this plan of care.        Tinea versicolor- ketoconazole shampoo as per patient instructions. Instructed to notify office if not improved in 5-7 days or if symptoms worsen. Verbalized understanding of instructions and agreeable to this plan of care.            Orders Placed This Encounter   Procedures    TSH W REFLEX TO FREE T4 [14354][Q]    COMP METABOLIC PANEL [57718] [Q]    HGB A1C [496] [Q]       Meds & Refills for this Visit:  Requested Prescriptions     Signed Prescriptions Disp Refills    Ketoconazole 1 % External Shampoo 125 mL 0     Sig: Apply 1 Application topically daily for 3 days.        Imaging & Consults:  None    No follow-ups on file.  Patient Instructions                         Apply Ketoconazole shampoo to arms daily, lease on for 5 minutes then rinse off. Do this for 3 consecutive days then stop.     Please complete blood work as ordered. Do blood work fasting- no food or drink except for plain water- for 8 hours prior to testing. Ideally, labs would be done early in the morning.   You can Google [email or web address] to find locations and schedule this testing.       Stop Pantoprazole. Then take over the counter famotidine (Pepcid) 20mg, one tab twice daily (morning and evening) for 7 days. Then take famotidine, one tab once daily for 5 days. Then you may stop this medication.        All questions were answered and the patient understands the plan.

## 2025-03-09 LAB
ALBUMIN/GLOBULIN RATIO: 1.6 (CALC) (ref 1–2.5)
ALBUMIN: 3.8 G/DL (ref 3.6–5.1)
ALKALINE PHOSPHATASE: 95 U/L (ref 37–153)
ALT: 25 U/L (ref 6–29)
AST: 18 U/L (ref 10–35)
BILIRUBIN, TOTAL: 0.6 MG/DL (ref 0.2–1.2)
BUN: 11 MG/DL (ref 7–25)
CALCIUM: 9 MG/DL (ref 8.6–10.4)
CARBON DIOXIDE: 28 MMOL/L (ref 20–32)
CHLORIDE: 107 MMOL/L (ref 98–110)
CREATININE: 0.73 MG/DL (ref 0.5–1.05)
EGFR: 93 ML/MIN/1.73M2
GLOBULIN: 2.4 G/DL (CALC) (ref 1.9–3.7)
GLUCOSE: 101 MG/DL (ref 65–99)
HEMOGLOBIN A1C: 6.1 % OF TOTAL HGB
POTASSIUM: 4.4 MMOL/L (ref 3.5–5.3)
PROTEIN, TOTAL: 6.2 G/DL (ref 6.1–8.1)
SODIUM: 143 MMOL/L (ref 135–146)
TSH W/REFLEX TO FT4: 2.86 MIU/L (ref 0.4–4.5)

## 2025-03-19 ENCOUNTER — TELEPHONE (OUTPATIENT)
Dept: FAMILY MEDICINE CLINIC | Facility: CLINIC | Age: 65
End: 2025-03-19

## 2025-04-25 ENCOUNTER — OFFICE VISIT (OUTPATIENT)
Dept: FAMILY MEDICINE CLINIC | Facility: CLINIC | Age: 65
End: 2025-04-25
Payer: COMMERCIAL

## 2025-04-25 VITALS
RESPIRATION RATE: 14 BRPM | WEIGHT: 205 LBS | SYSTOLIC BLOOD PRESSURE: 128 MMHG | BODY MASS INDEX: 34.16 KG/M2 | HEIGHT: 65 IN | DIASTOLIC BLOOD PRESSURE: 80 MMHG | OXYGEN SATURATION: 97 % | HEART RATE: 68 BPM

## 2025-04-25 DIAGNOSIS — R73.03 PREDIABETES: ICD-10-CM

## 2025-04-25 DIAGNOSIS — J45.31 MILD PERSISTENT ASTHMA WITH ACUTE EXACERBATION (HCC): ICD-10-CM

## 2025-04-25 DIAGNOSIS — F33.41 RECURRENT MAJOR DEPRESSIVE DISORDER, IN PARTIAL REMISSION: ICD-10-CM

## 2025-04-25 DIAGNOSIS — I10 ESSENTIAL HYPERTENSION: ICD-10-CM

## 2025-04-25 DIAGNOSIS — Z00.00 ANNUAL PHYSICAL EXAM: Primary | ICD-10-CM

## 2025-04-25 DIAGNOSIS — E03.9 HYPOTHYROIDISM (ACQUIRED): ICD-10-CM

## 2025-04-25 PROCEDURE — 99396 PREV VISIT EST AGE 40-64: CPT | Performed by: FAMILY MEDICINE

## 2025-04-25 PROCEDURE — 90471 IMMUNIZATION ADMIN: CPT | Performed by: FAMILY MEDICINE

## 2025-04-25 PROCEDURE — 90677 PCV20 VACCINE IM: CPT | Performed by: FAMILY MEDICINE

## 2025-04-25 RX ORDER — FUROSEMIDE 20 MG/1
20 TABLET ORAL DAILY PRN
Qty: 90 TABLET | Refills: 1 | Status: SHIPPED | OUTPATIENT
Start: 2025-04-25

## 2025-04-25 NOTE — ASSESSMENT & PLAN NOTE
3/8/2025: HEMOGLOBIN A1c 6.1 (H); GLUCOSE 101 (H) Sugar control is stable  Continue with current treatment plan  Pre-Diabetic. Not currently on Diabetic meds.

## 2025-04-25 NOTE — PROGRESS NOTES
Kaylynn Plummer is a 64 year old female who presents for a complete physical exam.     had concerns including Physical (WWE, see's gyne ).   No topic due editable text      Subjective:    History of Present Illness  Kaylynn Plummer is a 64 year old female who presents for an annual physical exam and evaluation of new skin rashes and swelling in her hands and feet.    Over the past month, she has developed rashes on her body in various spots. These rashes are small, rough-textured, scaly areas that turn red and then resolve. They have become more noticeable in the last couple of weeks.    She experiences swelling in her hands and feet, particularly in the morning. The swelling in her hands is not painful but is concerning to her. She has been more physically active recently, engaging in walking and running with her dog, without an increase in salt intake.    She reports occasional shooting pains up the left side of her neck and some chest discomfort, which are associated with physical activity, such as running with her dog.    She inquires about the need for a measles booster, given her birth year of 1960, and expresses interest in receiving a pneumonia vaccine due to her asthma history.    Her current medications include an albuterol inhaler and thyroid medication, though she is dissatisfied with the latter due to brand changes. She also takes vitamin D, which is due for a refill. Recent blood work from March 8th showed normal thyroid function and an A1c of 6.1, indicating prediabetes.      Symptoms: is menopausal. Patient complains of doing well, .   Tobacco:  She has never smoked tobacco.       Wt Readings from Last 4 Encounters:   04/25/25 205 lb (93 kg)   03/04/25 201 lb (91.2 kg)   02/07/25 203 lb (92.1 kg)   12/18/24 201 lb (91.2 kg)     Body mass index is 34.11 kg/m².     The 10-year ASCVD risk score (Kuldeep LOGAN, et al., 2019) is: 5.1%    Values used to calculate the score:      Age: 64 years       Sex: Female      Is Non- : No      Diabetic: No      Tobacco smoker: No      Systolic Blood Pressure: 128 mmHg      Is BP treated: Yes      HDL Cholesterol: 53 mg/dL      Total Cholesterol: 175 mg/dL    Chief Complaint Reviewed and Verified  Nursing Notes Reviewed and   Verified  Tobacco Reviewed  Allergies Reviewed  Medications Reviewed    Problem List Reviewed  Medical History Reviewed  Surgical History   Reviewed  OB Status Reviewed  Family History Reviewed          Her family history includes Arthritis in her mother; Cancer in her father and mother; Other in her mother, sister, and another family member; tobbaco use in her father.   She  reports that she has never smoked. She has never been exposed to tobacco smoke. She has never used smokeless tobacco. She reports that she does not currently use alcohol. She reports that she does not use drugs.    Exercise: three times per week, walking.  Diet: watches fats closely    GYNE HISTORY: Menstrual History:  OB History    Para Term  AB Living   0 0 0 0 0 0   SAB IAB Ectopic Multiple Live Births   0 0 0 0 0      Menarche age:    Patient's last menstrual period was 2015 (exact date).       Health Maintenance Due   Topic Date Due    Pneumococcal Vaccine: 50+ Years (1 of 2 - PCV) Never done    COVID-19 Vaccine (3 - - season) 2024    DTaP,Tdap,and Td Vaccines (2 - Td or Tdap) 10/22/2024    Annual Depression Screening  2025    Annual Physical  2025         Review of Systems   Constitutional: Negative.  Negative for activity change, appetite change, chills and fever.   HENT: Negative.     Eyes: Negative.    Respiratory: Negative.  Negative for shortness of breath.    Cardiovascular:  Positive for leg swelling. Negative for chest pain and palpitations.   Gastrointestinal: Negative.  Negative for abdominal pain.   Genitourinary: Negative.  Negative for dysuria.   Musculoskeletal:  Negative for  arthralgias.   Skin: Negative.  Negative for rash.   Allergic/Immunologic: Negative.    Neurological: Negative.         Results:    Lab Results   Component Value Date/Time    WBC 6.6 07/11/2024 01:32 PM    HGB 14.3 07/11/2024 01:32 PM    .0 07/11/2024 01:32 PM      Lab Results   Component Value Date/Time     (H) 03/08/2025 08:09 AM     03/08/2025 08:09 AM    K 4.4 03/08/2025 08:09 AM     03/08/2025 08:09 AM    CO2 28 03/08/2025 08:09 AM    CREATSERUM 0.73 03/08/2025 08:09 AM    CA 9.0 03/08/2025 08:09 AM    ALB 3.8 03/08/2025 08:09 AM    TP 6.2 03/08/2025 08:09 AM    ALKPHO 95 03/08/2025 08:09 AM    AST 18 03/08/2025 08:09 AM    ALT 25 03/08/2025 08:09 AM    BILT 0.6 03/08/2025 08:09 AM    TSH 1.31 11/24/2021 07:45 AM    T4F 1.3 11/24/2021 07:45 AM        Lab Results   Component Value Date/Time    CHOLEST 175 04/24/2023 08:31 AM    HDL 53 04/24/2023 08:31 AM    TRIG 111 04/24/2023 08:31 AM     (H) 04/24/2023 08:31 AM    NONHDLC 122 04/24/2023 08:31 AM       Last A1c value was 6.1% done 3/8/2025.     4/24/2023: VITAMIN D, 25-OH, TOTAL 51      Results  LABS  Thyroid function tests: Normal (03/08/2025)  Hemoglobin A1c: 6.1% (03/08/2025)  Electrolytes: Normal (03/08/2025)     Objective:    EXAM:  /80   Pulse 68   Resp 14   Ht 5' 5\" (1.651 m)   Wt 205 lb (93 kg)   LMP 07/27/2015 (Exact Date)   SpO2 97%   BMI 34.11 kg/m²  Estimated body mass index is 34.11 kg/m² as calculated from the following:    Height as of this encounter: 5' 5\" (1.651 m).    Weight as of this encounter: 205 lb (93 kg).   Physical Exam  Vitals and nursing note reviewed.   Constitutional:       General: She is not in acute distress.     Appearance: Normal appearance. She is well-developed.   HENT:      Head: Normocephalic and atraumatic.      Right Ear: Tympanic membrane and external ear normal.      Left Ear: Tympanic membrane and external ear normal.      Nose: Nose normal.      Mouth/Throat:      Mouth:  Mucous membranes are moist.   Eyes:      Extraocular Movements: Extraocular movements intact.      Pupils: Pupils are equal, round, and reactive to light.   Cardiovascular:      Rate and Rhythm: Normal rate and regular rhythm.      Pulses: Normal pulses.           Carotid pulses are 2+ on the right side and 2+ on the left side.       Radial pulses are 2+ on the right side and 2+ on the left side.        Dorsalis pedis pulses are 2+ on the right side and 2+ on the left side.        Posterior tibial pulses are 2+ on the right side and 2+ on the left side.      Heart sounds: Normal heart sounds, S1 normal and S2 normal. No murmur heard.  Pulmonary:      Effort: Pulmonary effort is normal. No respiratory distress.      Breath sounds: Normal breath sounds.   Abdominal:      General: Abdomen is flat. Bowel sounds are normal. There is no distension.      Palpations: Abdomen is soft.   Musculoskeletal:         General: Normal range of motion.      Cervical back: Normal range of motion and neck supple.      Right lower le+ Edema present.      Left lower le+ Edema present.   Skin:     General: Skin is warm and dry.      Capillary Refill: Capillary refill takes less than 2 seconds.      Findings: No rash.   Neurological:      General: No focal deficit present.      Mental Status: She is alert and oriented to person, place, and time.   Psychiatric:         Mood and Affect: Mood normal.         Behavior: Behavior normal.         Thought Content: Thought content normal.         Judgment: Judgment normal.        Physical Exam  NECK: Normal.  CHEST: Clear to auscultation bilaterally, no wheezes, rhonchi, or crackles.  EXTREMITIES: Mild edema present.  SKIN: Rough texture.       Assessment & Plan:    Kaylynn Plummer is a 64 year old female who presents for a complete physical exam.   Pt's weight is Body mass index is 34.11 kg/m²., recommended low fat diet and aerobic exercise 30 minutes three times weekly.   Health  maintenance, Up to date    Immunizations: Up to date   Immunization History   Administered Date(s) Administered    >=3 YRS FLUZONE OR FLUARIX QUAD PRESERVE FREE SINGLE DOSE (19698) FLU CLINIC 09/07/2016    Betamethason Acet&sod Phosp 06/13/2015    Covid-19 Vaccine Adelaida (J&J) 0.5ml 04/03/2021    Covid-19 Vaccine Moderna 100 mcg/0.5 ml 12/22/2021    FLULAVAL 6 months & older 0.5 ml Prefilled syringe (11541) 11/07/2018, 10/28/2019, 09/18/2020, 11/22/2021    FLUZONE 6 months and older PFS 0.5 ml (33761) 10/28/2019, 09/18/2020    Influenza 10/07/2014, 10/01/2017    Pneumococcal Conjugate PCV20 04/25/2025    TDAP 10/22/2014    Zoster Vaccine Recombinant Adjuvanted (Shingrix) 09/18/2020, 11/27/2020         Pt info given for: exercise, low fat diet, The patient indicates understanding of these issues and agrees to the plan.  The patient is asked to return for CPX in 1 years.    Assessment & Plan  Annual physical exam         Recurrent major depressive disorder, in partial remission  Clinical Course: stable   Good control    Not currently on Behavioral health meds.          Essential hypertension  BP shows borderline control with last BP of 128/80. Work on lifestyle changes, diet, exercise and weight management.   3/8/2025: POTASSIUM 4.4; CREATININE 0.73; EGFR 93  BP Meds: furosemide Tabs - 20 MG; losartan Tabs - 50 MG   ACE/ARB Adherence Excellent at 99%      Orders:    Furosemide; Take 1 tablet (20 mg total) by mouth daily as needed (edema).  Dispense: 90 tablet; Refill: 1    Mild persistent asthma with acute exacerbation (HCC)  Asthma Severity  Moderate Persistent without exacerbation     Treatment  Asthma Control Test Performed: Well controlled, Asthma action plan reviewed with patient  Quick relief inhalers, Long term asthma control medications, and Antibiotics and oral steroids          Prediabetes  3/8/2025: HEMOGLOBIN A1c 6.1 (H); GLUCOSE 101 (H) Sugar control is stable  Continue with current treatment  plan  Pre-Diabetic. Not currently on Diabetic meds.          Hypothyroidism (acquired)  Thyroid shows Good control.   11/24/2021: TSH 1.31  Thryoid Meds: levothyroxine Tabs - 125 MCG well controlled current treatment plan effective, no change in therapy            Assessment & Plan  Wellness Visit  Annual physical examination conducted without specific concerns.    Pre-diabetes  A1c at 6.1%, indicating pre-diabetes. She has increased exercise to manage blood glucose levels.    Peripheral edema  Intermittent swelling of hands and feet, possibly due to increased physical activity and dietary factors. Previous reaction to triamterene hydrochlorothiazide noted. Lasix considered as an alternative diuretic.  - Prescribe Lasix for use on days at home, ensuring adequate potassium intake.  - Monitor for effectiveness and adjust dosage if necessary.    Asthma  Asthma is well-controlled. Qualifies for the pneumonia vaccine under current guidelines due to asthma.  - Administer pneumonia vaccine.    Allergic rhinitis  Allergies are currently problematic, likely due to seasonal changes.    Seborrheic keratosis  Recent development of scaly, rough-textured rashes, likely seborrheic keratosis. Explained commonality and benign nature of the condition.  - Apply moisturizers like Lubriderm, Aquaphor, Eucerin, or Aveeno to affected areas.  - Consider cryotherapy if lesions enlarge.      I am having Kaylynn Plummer start on furosemide. I am also having her maintain her OptiChamber Kami, Fluticasone-Salmeterol, Sodium Fluoride 5000 Plus, ergocalciferol, albuterol, clobetasol, guaiFENesin-codeine, naproxen, cyclobenzaprine, Metaxalone, pantoprazole, losartan, and levothyroxine.     Return in about 1 year (around 4/25/2026) for Annual physical, Or sooner if symptoms persist.

## 2025-04-25 NOTE — ASSESSMENT & PLAN NOTE
BP shows borderline control with last BP of 128/80. Work on lifestyle changes, diet, exercise and weight management.   3/8/2025: POTASSIUM 4.4; CREATININE 0.73; EGFR 93  BP Meds: furosemide Tabs - 20 MG; losartan Tabs - 50 MG   ACE/ARB Adherence Excellent at 99%      Orders:    Furosemide; Take 1 tablet (20 mg total) by mouth daily as needed (edema).  Dispense: 90 tablet; Refill: 1

## 2025-04-25 NOTE — PROGRESS NOTES
The following individual(s) verbally consented to be recorded using ambient AI listening technology and understand that they can each withdraw their consent to this listening technology at any point by asking the clinician to turn off or pause the recording:    Patient name: Kaylynn Plummer

## 2025-04-25 NOTE — ASSESSMENT & PLAN NOTE
Thyroid shows Good control.   11/24/2021: TSH 1.31  Thryoid Meds: levothyroxine Tabs - 125 MCG well controlled current treatment plan effective, no change in therapy

## 2025-04-25 NOTE — ASSESSMENT & PLAN NOTE
Asthma Severity  Moderate Persistent without exacerbation     Treatment  Asthma Control Test Performed: Well controlled, Asthma action plan reviewed with patient  Quick relief inhalers, Long term asthma control medications, and Antibiotics and oral steroids

## 2025-04-27 ENCOUNTER — APPOINTMENT (OUTPATIENT)
Dept: GENERAL RADIOLOGY | Age: 65
End: 2025-04-27
Attending: NURSE PRACTITIONER
Payer: COMMERCIAL

## 2025-04-27 ENCOUNTER — HOSPITAL ENCOUNTER (OUTPATIENT)
Age: 65
Discharge: HOME | End: 2025-04-27
Payer: COMMERCIAL

## 2025-04-27 VITALS
SYSTOLIC BLOOD PRESSURE: 105 MMHG | HEART RATE: 89 BPM | DIASTOLIC BLOOD PRESSURE: 47 MMHG | TEMPERATURE: 98 F | RESPIRATION RATE: 20 BRPM | OXYGEN SATURATION: 96 %

## 2025-04-27 DIAGNOSIS — J45.21 MILD INTERMITTENT ASTHMA WITH EXACERBATION (HCC): ICD-10-CM

## 2025-04-27 DIAGNOSIS — J06.9 VIRAL URI WITH COUGH: Primary | ICD-10-CM

## 2025-04-27 DIAGNOSIS — Z11.52 ENCOUNTER FOR SCREENING FOR COVID-19: ICD-10-CM

## 2025-04-27 LAB
POCT INFLUENZA A: NEGATIVE
POCT INFLUENZA B: NEGATIVE
SARS-COV-2 RNA RESP QL NAA+PROBE: NOT DETECTED

## 2025-04-27 PROCEDURE — U0002 COVID-19 LAB TEST NON-CDC: HCPCS | Performed by: NURSE PRACTITIONER

## 2025-04-27 PROCEDURE — 71046 X-RAY EXAM CHEST 2 VIEWS: CPT | Performed by: NURSE PRACTITIONER

## 2025-04-27 PROCEDURE — 99213 OFFICE O/P EST LOW 20 MIN: CPT | Performed by: NURSE PRACTITIONER

## 2025-04-27 PROCEDURE — 87502 INFLUENZA DNA AMP PROBE: CPT | Performed by: NURSE PRACTITIONER

## 2025-04-27 PROCEDURE — 94640 AIRWAY INHALATION TREATMENT: CPT | Performed by: NURSE PRACTITIONER

## 2025-04-27 RX ORDER — IPRATROPIUM BROMIDE AND ALBUTEROL SULFATE 2.5; .5 MG/3ML; MG/3ML
3 SOLUTION RESPIRATORY (INHALATION) ONCE
Status: COMPLETED | OUTPATIENT
Start: 2025-04-27 | End: 2025-04-27

## 2025-04-27 RX ORDER — PREDNISONE 20 MG/1
40 TABLET ORAL DAILY
Qty: 10 TABLET | Refills: 0 | Status: SHIPPED | OUTPATIENT
Start: 2025-04-27 | End: 2025-05-02

## 2025-04-27 RX ORDER — ALBUTEROL SULFATE 90 UG/1
2 INHALANT RESPIRATORY (INHALATION) EVERY 4 HOURS PRN
Qty: 1 EACH | Refills: 0 | Status: SHIPPED | OUTPATIENT
Start: 2025-04-27 | End: 2025-05-27

## 2025-04-27 RX ORDER — BENZONATATE 100 MG/1
100 CAPSULE ORAL 3 TIMES DAILY PRN
Qty: 30 CAPSULE | Refills: 0 | Status: SHIPPED | OUTPATIENT
Start: 2025-04-27 | End: 2025-05-27

## 2025-04-27 NOTE — ED PROVIDER NOTES
Patient Seen in: [facility]      History     Chief Complaint   Patient presents with   • Cough     Stated Complaint: Cough    Subjective:   64-year-old female presents with complaint of nasal congestion, cough, wheezing that began 2 days ago.  Unsure if has had a fever; does not have a thermometer at home.  History of asthma, using her albuterol inhaler.  OTCs include Mucinex, Tylenol, ibuprofen.    Patient denies measured fever, chills, malaise, shortness of breath, chest pain, peripheral edema, nausea, vomiting, diarrhea.          The history is provided by the patient.         History of Present Illness               Objective:     Past Medical History:   • Abdominal hernia   • Allergic rhinitis   • Allergy, unspecified not elsewhere classified   • Asthma (HCC)   • Atypical mole    removed   • Back pain   • Bloating   • Borderline diabetes    Dx in 2013   • Cancer (HCC)    skin cancer   • Chronic cough   • Depression    many years ago   • Depressive disorder   • Fatigue   • Flatulence/gas pain/belching   • GERD (gastroesophageal reflux disease)    worst at night   • Heartburn   • Hemorrhoids   • Herpes simplex without mention of complication   • High cholesterol   • History of depression    several years ago   • History of general anesthesia complication    vomiting   • Hypothyroidism    Dx in 2015   • Night sweats   • Pain in joints   • Pain with bowel movements   • Shortness of breath   • Skin blushing/flushing   • Sputum production   • Stool incontinence   • Stress   • Ulcer of esophagus with bleeding   • Vitamin D deficiency    Dx in 2012   • Wears glasses   • Weight gain              Past Surgical History:   Procedure Laterality Date   • [facility]   • Colonoscopy     • Egd  08/25/2017    [facility]   • Other surgical history      Dental surgeries   • Sigmoidoscopy,diagnostic      years ago   • Skin surgery      years ago   •  Tonsillectomy      In childhood                Social History     Socioeconomic History   • Marital status:    • Number of children: 0   Occupational History   • Occupation:    Tobacco Use   • Smoking status: Never     Passive exposure: Never   • Smokeless tobacco: Never   Vaping Use   • Vaping status: Never Used   Substance and Sexual Activity   • Alcohol use: Not Currently     Comment: infrequent   • Drug use: No   Other Topics Concern   • Caffeine Concern Yes   • Stress Concern No   • Weight Concern Yes   • Special Diet No   • Exercise Yes   • Seat Belt Yes   • Self-Exams Yes     Comment: self breast exam a couple times a month     Social Drivers of Health     Food Insecurity: No Food Insecurity (4/25/2025)    NCSS - Food Insecurity    • Worried About Running Out of Food in the Last Year: No    • Ran Out of Food in the Last Year: No   Transportation Needs: No Transportation Needs (4/25/2025)    NCSS - Transportation    • Lack of Transportation: No   Housing Stability: Not At Risk (4/25/2025)    NCSS - Housing/Utilities    • Has Housing: Yes    • Worried About Losing Housing: No    • Unable to Get Utilities: No              Review of Systems   Constitutional:  Negative for chills, diaphoresis and fever.   HENT:  Positive for congestion and rhinorrhea. Negative for trouble swallowing.    Respiratory:  Positive for cough and wheezing.    Cardiovascular:  Negative for chest pain, palpitations and leg swelling.   Gastrointestinal:  Negative for abdominal pain, diarrhea, nausea and vomiting.       Positive for stated complaint: Cough  Other systems are as noted in HPI.  Constitutional and vital signs reviewed.      All other systems reviewed and negative except as noted above.                  Physical Exam     ED Triage Vitals   BP 04/27/25 1327 105/47   Pulse 04/27/25 1320 89   Resp 04/27/25 1320 20   Temp 04/27/25 1320 98.4 °F (36.9 °C)   Temp src 04/27/25 1320 Oral   SpO2 04/27/25 1320 96 %   O2  Device 04/27/25 1320 None (Room air)       Current Vitals:   Vital Signs  BP: 105/47  Pulse: 89  Resp: 20  Temp: 98.4 °F (36.9 °C)  Temp src: Oral    Oxygen Therapy  SpO2: 96 %  O2 Device: None (Room air)        Physical Exam  Vitals and nursing note reviewed.   Constitutional:       General: She is not in acute distress.     Appearance: Normal appearance. She is not ill-appearing, toxic-appearing or diaphoretic.   HENT:      Head: Normocephalic.      Right Ear: Tympanic membrane normal.      Left Ear: Tympanic membrane normal.      Nose: Congestion and rhinorrhea present.      Mouth/Throat:      Mouth: Mucous membranes are moist.      Pharynx: Oropharynx is clear.   Eyes:      General: No scleral icterus.     Conjunctiva/sclera: Conjunctivae normal.   Cardiovascular:      Rate and Rhythm: Normal rate and regular rhythm.      Heart sounds: No murmur heard.  Pulmonary:      Effort: Pulmonary effort is normal. No respiratory distress.      Breath sounds: No stridor. Wheezing (faint expiratory wheezes) present. No rhonchi or rales.   Chest:      Chest wall: No tenderness.   Musculoskeletal:      Cervical back: Normal range of motion and neck supple.   Lymphadenopathy:      Cervical: No cervical adenopathy.   Skin:     General: Skin is warm and dry.      Findings: No rash.   Neurological:      Mental Status: She is alert.   Psychiatric:         Mood and Affect: Mood normal.         Physical Exam                ED Course     Labs Reviewed   POCT FLU TEST - Normal    Narrative:     This assay is a rapid molecular in vitro test utilizing nucleic acid amplification of influenza A and B viral RNA.   RAPID SARS-COV-2 BY PCR - Normal          Results           MDM      Medical Decision Making  64-year-old female presents with complaint of nasal congestion, cough, wheezing that began 2 days ago.  Differential diagnoses include influenza, COVID, bronchitis, other viral URI.    Amount and/or Complexity of Data Reviewed  External  Data Reviewed: notes.  Labs: ordered.  Radiology: ordered.    Risk  OTC drugs.  Prescription drug management.        Disposition and Plan     Clinical Impression:  1. Acute cough    2. Encounter for screening for COVID-19         Disposition:  There is no disposition on file for this visit.  There is no disposition time on file for this visit.    Follow-up:  No follow-up provider specified.        Medications Prescribed:  Current Discharge Medication List          Supplementary Documentation:

## 2025-04-27 NOTE — DISCHARGE INSTRUCTIONS
- Prednisone: take 2 tabs with breakfast in the AM x 5 days.  - Tessalon 1 capusule every 8 hours as needed.  - Take plain mucinex - 1200mg twice a day with a big glass of water  Albuterol: 2 puffs every 4-6 hours if needed for wheezing.   - Nasal saline - either spray (\"Ocean\" brand) or Ken Med Sinus Rinse 3 times a day  Flonase: 2 sprays to each nostril in the AM.  - Rest and push fluids  - Hot lemon tea with honey  - Steam twice a day (either in shower or with cup of hot water and a towel over your head)     Please follow up with your primary care doctor in 1 week if not improving.    If any persistent, worsening or new/ concerning symptoms develop please go to the Emergency room.

## 2025-04-28 ENCOUNTER — TELEPHONE (OUTPATIENT)
Dept: FAMILY MEDICINE CLINIC | Facility: CLINIC | Age: 65
End: 2025-04-28

## 2025-04-28 RX ORDER — ALBUTEROL SULFATE 0.83 MG/ML
2.5 SOLUTION RESPIRATORY (INHALATION) EVERY 4 HOURS PRN
Qty: 30 ML | Refills: 0 | Status: SHIPPED | OUTPATIENT
Start: 2025-04-28

## 2025-04-28 NOTE — TELEPHONE ENCOUNTER
That interaction was with Dr. Olivo, I do not see it in his not (autoimmune testing). Should have visit for follow up next week of UC visit, can discuss furosemide and testing at that time as well.    regular rate and rhythm , S1, S2 normal ,  , no murmurs, rubs, gallops , no edema

## 2025-04-28 NOTE — TELEPHONE ENCOUNTER
Received pneumonia vaccine on 4/25. Became ill with URI symptoms on Saturday and was seen in the UC yesterday diagnosed with mild asthma/ viral URI.     Pt states that Dr Olivo put patient on lasix for swelling in legs, but patient isn't taking it because her grandma had a reaction to it. She is going to hold off on the lasix for now.     She would like to go ahead with autoimmune panel that she discussed with you on 4/25/25.

## 2025-04-28 NOTE — TELEPHONE ENCOUNTER
Pt script for nebulizer is  she would like to get it filled. Was seen in  on  for asthma exacerbation/viral URI.  She is coughing frequently and is using her inhaler.   Pt has the nebulizer medication, just needs the machine.   Her PCP isn't in office his week.

## 2025-04-28 NOTE — TELEPHONE ENCOUNTER
Spoke w/pt and gave all information. Pt verbalized agreement and understanding and scheduled appt w/Dr. Olivo on 5/9

## 2025-05-09 ENCOUNTER — OFFICE VISIT (OUTPATIENT)
Dept: FAMILY MEDICINE CLINIC | Facility: CLINIC | Age: 65
End: 2025-05-09
Payer: COMMERCIAL

## 2025-05-09 VITALS
WEIGHT: 203.81 LBS | BODY MASS INDEX: 33.96 KG/M2 | SYSTOLIC BLOOD PRESSURE: 132 MMHG | DIASTOLIC BLOOD PRESSURE: 80 MMHG | RESPIRATION RATE: 14 BRPM | HEIGHT: 65 IN | OXYGEN SATURATION: 98 % | HEART RATE: 78 BPM

## 2025-05-09 DIAGNOSIS — R53.83 FATIGUE, UNSPECIFIED TYPE: ICD-10-CM

## 2025-05-09 DIAGNOSIS — L30.9 DERMATITIS: Primary | ICD-10-CM

## 2025-05-09 DIAGNOSIS — J01.41 ACUTE RECURRENT PANSINUSITIS: ICD-10-CM

## 2025-05-09 PROCEDURE — 99213 OFFICE O/P EST LOW 20 MIN: CPT | Performed by: FAMILY MEDICINE

## 2025-05-09 PROCEDURE — 99214 OFFICE O/P EST MOD 30 MIN: CPT | Performed by: FAMILY MEDICINE

## 2025-05-09 RX ORDER — CEFUROXIME AXETIL 250 MG/1
250 TABLET ORAL 2 TIMES DAILY
Qty: 20 TABLET | Refills: 0 | Status: SHIPPED | OUTPATIENT
Start: 2025-05-09 | End: 2025-05-19

## 2025-05-09 NOTE — PROGRESS NOTES
Subjective:   Kaylynn is a 65 year old female coming in for had concerns including Swelling (Discuss about the swelling and see what are the next steps ).   Swelling      History of Present Illness  Kaylynn Plummer is a 65 year old female who presents with concerns about a possible autoimmune condition and recent illness.    She has a history of sulfa allergy, which causes lip swelling, and recalls a previous reaction to hydrochlorothiazide triamterene resulting in a rash. Recently, she was unable to obtain Lasix from the pharmacist due to concerns about sulfur content, although she has not experienced a reaction to furosemide in the past.    Her recent illness began the night after her last visit, with symptoms including severe congestion, cough, exhaustion, and a metallic taste in her mouth. COVID-19 and flu tests were negative. She was prescribed prednisone at an immediate care visit, which helped alleviate symptoms, though they have not completely resolved. She continues to experience lingering chest congestion and a metallic taste.    She has been using Xyzal periodically but finds it ineffective if taken consecutively. She also tried Flonase for symptom relief. She uses both Air Duo and albuterol inhalers but notes that excessive use leads to stomach issues. She prefers the powder form of inhalers to minimize gastrointestinal side effects.    Family history is significant for autoimmune conditions, including scleroderma in her sister and arthritis on both sides of the family. Two cousins  of heart-related scleroderma. She has experienced Raynaud's phenomenon, which she acknowledges is common.     FHx multiple peoiple with autoimmune issues including RA and scleroderma  Seen 4.25 and had severe reaction for weekend with URI symptoms.     /80   Pulse 78   Resp 14   Ht 5' 5\" (1.651 m)   Wt 203 lb 12.8 oz (92.4 kg)   LMP 2015 (Exact Date)   SpO2 98%   BMI 33.91 kg/m²  Body mass index is  33.91 kg/m².   Physical Exam  Vitals and nursing note reviewed.   Constitutional:       General: She is not in acute distress.     Appearance: Normal appearance. She is well-developed.   HENT:      Head: Normocephalic and atraumatic.      Right Ear: Tympanic membrane, ear canal and external ear normal.      Left Ear: Tympanic membrane and external ear normal.      Nose: Mucosal edema present. No septal deviation or rhinorrhea.      Right Sinus: Frontal sinus tenderness present.      Mouth/Throat:      Pharynx: Posterior oropharyngeal erythema present. No oropharyngeal exudate.   Eyes:      Conjunctiva/sclera: Conjunctivae normal.   Cardiovascular:      Rate and Rhythm: Normal rate and regular rhythm.      Pulses:           Posterior tibial pulses are 2+ on the right side and 2+ on the left side.      Heart sounds: Normal heart sounds. No murmur heard.  Pulmonary:      Effort: Pulmonary effort is normal. No respiratory distress.      Breath sounds: Normal breath sounds. No wheezing.   Abdominal:      General: Bowel sounds are normal.      Palpations: Abdomen is soft.      Tenderness: There is no abdominal tenderness.   Musculoskeletal:         General: Swelling present. Normal range of motion.      Cervical back: Normal range of motion.      Right lower leg: No edema.      Left lower leg: No edema.   Skin:     General: Skin is warm and dry.      Findings: No rash.   Neurological:      Mental Status: She is alert and oriented to person, place, and time.   Psychiatric:         Mood and Affect: Mood normal.         Behavior: Behavior normal.         Thought Content: Thought content normal.         Judgment: Judgment normal.        Physical Exam  HEENT: Ears normal, nasal passages slightly swollen  CHEST: Lungs clear to auscultation bilaterally, no wheezes, rhonchi, or crackles        Results  LABS  COVID-19: negative     Assessment & Plan  Dermatitis    Orders:    Rheumatoid Arthritis Factor    Cyclic Citrullinate Pep.  IGG    Sed Rate, Westergren (Automated)    C-Reactive Protein    Rufus, Direct, Reflex To 9 Enas    Comp Metabolic Panel (14)    CBC With Differential With Platelet    Fatigue, unspecified type    Orders:    Rheumatoid Arthritis Factor    Cyclic Citrullinate Pep. IGG    Sed Rate, Westergren (Automated)    C-Reactive Protein    Rufus, Direct, Reflex To 9 Enas    Comp Metabolic Panel (14)    CBC With Differential With Platelet    Acute recurrent pansinusitis            Other orders    Cefuroxime Axetil; Take 1 tablet (250 mg total) by mouth 2 (two) times daily for 10 days.  Dispense: 20 tablet; Refill: 0     Assessment & Plan  Mild persistent asthma with acute exacerbation  Recent exacerbation with coughing, congestion, and wheezing. Prednisone improved symptoms by 50%. Potential secondary bacterial infection considered.  - Prescribe cefuroxime for potential secondary bacterial infection.  - Consult pulmonologist regarding inhaler use and potential switch to powder form to reduce gastrointestinal issues.  - Encourage use of spacer with inhalers to minimize gastrointestinal side effects.    Peripheral edema  Concerns about potential sulfa allergy related to furosemide use. No direct evidence of reaction to furosemide, but she has a sulfa allergy with other medications.  - Monitor for allergic reaction if furosemide is used.  - Consider alternative diuretics if necessary, avoiding those with sulfa components.    Sulfa allergy  Known sulfa allergy with severe reactions. Caution advised with furosemide despite it not being commonly associated with sulfa reactions.  - Avoid medications with known sulfa components.  - Educate on potential cross-reactivity and signs of allergic reaction.    General Health Maintenance  Family history of autoimmune diseases. Discussed autoimmune screening tests and their interpretation.  - Order autoimmune screening tests including ESR, C-reactive protein, RUFUS, rheumatoid factor, and cyclic  citrullinated peptide.  - Educate on the interpretation of autoimmune tests and potential implications.  I have discontinued Kaylynn Plummer's Metaxalone, pantoprazole, and furosemide. I am also having her start on cefuroxime. Additionally, I am having her maintain her OptiChamber Kami, Fluticasone-Salmeterol, Sodium Fluoride 5000 Plus, ergocalciferol, albuterol, clobetasol, guaiFENesin-codeine, naproxen, cyclobenzaprine, losartan, levothyroxine, and benzonatate.       Return in about 4 months (around 9/9/2025) for Or sooner if symptoms persist.

## 2025-06-06 DIAGNOSIS — J45.41 MODERATE PERSISTENT ASTHMA WITH ACUTE EXACERBATION (HCC): ICD-10-CM

## 2025-06-09 RX ORDER — CODEINE PHOSPHATE AND GUAIFENESIN 10; 100 MG/5ML; MG/5ML
5 SOLUTION ORAL 3 TIMES DAILY PRN
Qty: 180 ML | Refills: 0 | Status: SHIPPED | OUTPATIENT
Start: 2025-06-09

## 2025-06-09 NOTE — TELEPHONE ENCOUNTER
Controlled medication    Requested Prescriptions     Pending Prescriptions Disp Refills    GUAIFENESIN-CODEINE 100-10 MG/5ML Oral Solution [Pharmacy Med Name: CODEINE-GUAIFEN 10-100MG/5ML SOL] 180 mL 0     Sig: TAKE 5 ML BY MOUTH THREE TIMES DAILY AS NEEDED FOR COUGH        Last refill: 7/19/24    Last Appointment: 05/09/25    Next Appointment: Visit date not found

## 2025-06-20 ENCOUNTER — HOSPITAL ENCOUNTER (OUTPATIENT)
Dept: CT IMAGING | Facility: HOSPITAL | Age: 65
Discharge: HOME | End: 2025-06-20
Attending: INTERNAL MEDICINE
Payer: COMMERCIAL

## 2025-06-20 DIAGNOSIS — R91.1 PULMONARY NODULE: ICD-10-CM

## 2025-06-20 PROCEDURE — 71250 CT THORAX DX C-: CPT | Performed by: INTERNAL MEDICINE

## 2025-06-23 LAB
ABSOLUTE BASOPHILS: 17 CELLS/UL (ref 0–200)
ABSOLUTE EOSINOPHILS: 131 CELLS/UL (ref 15–500)
ABSOLUTE LYMPHOCYTES: 1231 CELLS/UL (ref 850–3900)
ABSOLUTE MONOCYTES: 348 CELLS/UL (ref 200–950)
ABSOLUTE NEUTROPHILS: 3973 CELLS/UL (ref 1500–7800)
ALBUMIN/GLOBULIN RATIO: 1.8 (CALC) (ref 1–2.5)
ALBUMIN: 4.1 G/DL (ref 3.6–5.1)
ALKALINE PHOSPHATASE: 88 U/L (ref 37–153)
ALT: 12 U/L (ref 6–29)
ANA SCREEN, IFA: NEGATIVE
AST: 12 U/L (ref 10–35)
BASOPHILS: 0.3 %
BILIRUBIN, TOTAL: 0.6 MG/DL (ref 0.2–1.2)
BUN: 13 MG/DL (ref 7–25)
C-REACTIVE PROTEIN: 10.7 MG/L
CALCIUM: 9.3 MG/DL (ref 8.6–10.4)
CARBON DIOXIDE: 28 MMOL/L (ref 20–32)
CHLORIDE: 104 MMOL/L (ref 98–110)
CREATININE: 0.78 MG/DL (ref 0.5–1.05)
CYCLIC CITRULLINATED$PEPTIDE (CCP) AB (IGG): <16 UNITS
EGFR: 85 ML/MIN/1.73M2
EOSINOPHILS: 2.3 %
GLOBULIN: 2.3 G/DL (CALC) (ref 1.9–3.7)
GLUCOSE: 107 MG/DL (ref 65–99)
HEMATOCRIT: 44.9 % (ref 35–45)
HEMOGLOBIN: 14.1 G/DL (ref 11.7–15.5)
LYMPHOCYTES: 21.6 %
MCH: 27.9 PG (ref 27–33)
MCHC: 31.4 G/DL (ref 32–36)
MCV: 88.9 FL (ref 80–100)
MONOCYTES: 6.1 %
MPV: 11.1 FL (ref 7.5–12.5)
NEUTROPHILS: 69.7 %
PLATELET COUNT: 262 THOUSAND/UL (ref 140–400)
POTASSIUM: 4.3 MMOL/L (ref 3.5–5.3)
PROTEIN, TOTAL: 6.4 G/DL (ref 6.1–8.1)
RDW: 13.9 % (ref 11–15)
RED BLOOD CELL COUNT: 5.05 MILLION/UL (ref 3.8–5.1)
RHEUMATOID FACTOR: <10 IU/ML
SED RATE BY MODIFIED$WESTERGREN: 6 MM/H
SODIUM: 140 MMOL/L (ref 135–146)
WHITE BLOOD CELL COUNT: 5.7 THOUSAND/UL (ref 3.8–10.8)

## (undated) DIAGNOSIS — I10 ESSENTIAL HYPERTENSION: ICD-10-CM

## (undated) DIAGNOSIS — F33.41 RECURRENT MAJOR DEPRESSIVE DISORDER, IN PARTIAL REMISSION (HCC): ICD-10-CM

## (undated) DIAGNOSIS — J45.30 MILD PERSISTENT ASTHMA WITHOUT COMPLICATION: ICD-10-CM

## (undated) NOTE — LETTER
AUTHORIZATION FOR SURGICAL OPERATION OR OTHER PROCEDURE    1.  I hereby authorize Dr. Olegario Duque  and [facility] staff assigned to my case to perform the following operation and/or procedure at the [facility]:     Cortisone injection in Ri Time:  ________ A. M.  P.M.        Patient Name:  ______________________________________________________  (please print)      Patient signature:  ___________________________________________________             Relationship to Patient:

## (undated) NOTE — MR AVS SNAPSHOT
[facility]  [place],  [address] [place] [address] [phone]               Thank you for choosing us for your health care visit with Nusrat Simons MD.  We are glad to serve you and happy to provide you with this summa Dyazide [Hydrochlorothiazide W/Triamterene] Rash    Erythromycin Nausea only    Tabs;    Minocycline Hives    Mold Unknown    Pristiq [Desvenlafaxine]     Hallucinations, headaches, dizziness    Wellbutrin [Bupropion Hcl] Itching    SR TBCR; Headache Sherrie                  Visit [facility] online at  [email or web address]

## (undated) NOTE — MR AVS SNAPSHOT
[facility]  [place],  [address] [place] [address] [phone]               Thank you for choosing us for your health care visit with Radha Hairston MD.  We are glad to serve you and happy to provide you with this summa Hallucinations, headaches, dizziness    Wellbutrin [Bupropion Hcl] Itching    SR TBCR; Headache                    Today's Vital Signs     BP Pulse Temp Height Weight BMI    130/70 mmHg 100 98.8 °F (37.1 °C) 65\" 190 lb 31.62 kg/m2    Breastfeeding? These medications were sent to JaylonLisa perez [phone], [facility], [address] [facility] [address]     Phone:  [phone]    - azithromycin 250 MG Tabs  - Fluticasone Furoate-Vilanterol 200-25 MCG/INH Ae

## (undated) NOTE — LETTER
ASTHMA ACTION PLAN for Zechariah CollinsSuzeStefankei     : 1960     Date: 20  Doctor:  Radha Hairston MD  Phone for doctor or clinic: [facility], [address] [place] [address], [address] [place] [address] Elizabeth  Bhanu Sherwin [phone] If your symptoms do not improve in ONE hour -  go to the emergency room or call 911 immediately! If symptoms improve, call office for appointment immediately.     Albuterol inhaler 2 puffs every 20 minutes for three treatments       Don't forget:  · Rinse

## (undated) NOTE — MR AVS SNAPSHOT
[facility]  [place],  [address] [place] [address] [phone]               Thank you for choosing us for your health care visit with Archie Flynn MD.  We are glad to serve you and happy to provide you with this summa recent med list.                Clobetasol Propionate 0.05 % Soln   Apply topically to affected area twice a day as needed.    Commonly known as:  TEMOVATE           CYMBALTA 60 MG Cpep   Generic drug:  DULoxetine HCl   TAKE ONE CAPSULE BY MOUTH DAILY

## (undated) NOTE — LETTER
ASTHMA ACTION PLAN for Donnell Larsen     : 1960     Date: 3/16/2019  Provider:  Shannan Bowers MD  Phone for doctor or clinic: [facility], [address] [facility], [address] [address] [address] ,[address], [address]  [address] [place] [address] [phone]

## (undated) NOTE — ED AVS SNAPSHOT
[facility] in [address] [address]    Phone:  [phone]    Fax:  [phone]           Kristi Raymundo   MRN: [id number]    Department:  [facility] AND [facility] in [address] [place] [address] Bring a paper prescription for each of these medications    - benzonatate 100 MG Caps              Discharge Instructions       Rest, fluids, Tylenol or Motrin for pain or fever. Take the cough medicine and antibiotics as previously directed.   Follow-up this physician (or your personal doctor if your instructions are to return to your personal doctor) about any new or lasting problems. The primary care or specialist physician may see patients referred from the [facility].  Fo Gabriela  W. [employer] ([address]) [address] [facility] [employer] ([address]) [place] [address] ([place]) [phone]   Gavin Holman [address] [employer]  [facility] &

## (undated) NOTE — Clinical Note
Date: 4/3/2017    Patient Name: Sean Tenorio          To Whom it may concern: This letter has been written at the patient's request. The above patient [place] [facility] [place] of a medical condition.     This patient

## (undated) NOTE — MR AVS SNAPSHOT
[facility]  [place], [address] [place] [address] [phone]               Thank you for choosing us for your health care visit with Christo Rutherford DO.   We are glad to serve you and happy to provide you with this sum Today's Vital Signs     BP Pulse Temp Weight          110/70 mmHg 80 98.7 °F (37.1 °C) (Oral) 192 lb 6.4 oz           Current Medications          This list is accurate as of: 5/8/17 10:13 AM.  Always use your most recent med list.                Padmini Aquino Phone:  [phone]    - MetFORMIN HCl  MG Tb24            MyChart                  Visit [facility] online at  [email or web address]

## (undated) NOTE — LETTER
ASTHMA ACTION PLAN for Sean Tenorio     : 1960     Date: 2018  Provider:  Matthew Garcia MD  Phone for doctor or clinic: [facility], [address] [facility], [address] [facility] [address] [place] [address], [address]  [address]

## (undated) NOTE — Clinical Note
ASTHMA ACTION PLAN for Priscila CollinsSuzeStefankei     : 1960     Date: 2017  Doctor:  Silva Meehan MD  Phone for doctor or clinic: [facility], [address] [place] [address], [address] [place] [address] [place], [place] [phone] or call 911 immediately! If symptoms improve, call office for appointment immediately.     Albuterol inhaler 2 puffs every 20 minutes for three treatments       Don't forget:  · Rinse mouth after using inhaler  · Use spacer for inhaler  · Remember to get yo

## (undated) NOTE — MR AVS SNAPSHOT
[address] [facility], [address]  [address] [place] [address] [phone]               Thank you for choosing us for your health care visit with Sharri Chilel MD.  We are glad to serve you and happy to provide you with this summa Medical Issues Discussed Today     Essential hypertension    Hypothyroidism (acquired)    Prediabetes    Right hip pain        Visit for screening mammogram          Instructions and Information about Your Health     None      Allergies as of Jun 09 Commonly known as:  ELOCON           Tetracycline HCl 500 MG Caps   Take 1 tablet daily   Commonly known as:  ACHROMYCIN,SUMYCIN           VENTOLIN  (90 Base) MCG/ACT Aers   Generic drug:  Albuterol Sulfate HFA   Inhale 2 Puffs into the lungs every